# Patient Record
Sex: MALE | Race: WHITE | NOT HISPANIC OR LATINO | Employment: OTHER | URBAN - METROPOLITAN AREA
[De-identification: names, ages, dates, MRNs, and addresses within clinical notes are randomized per-mention and may not be internally consistent; named-entity substitution may affect disease eponyms.]

---

## 2018-04-04 ENCOUNTER — APPOINTMENT (OUTPATIENT)
Dept: GENERAL RADIOLOGY | Facility: HOSPITAL | Age: 63
End: 2018-04-04

## 2018-04-04 ENCOUNTER — HOSPITAL ENCOUNTER (EMERGENCY)
Facility: HOSPITAL | Age: 63
Discharge: HOME | End: 2018-04-04
Attending: EMERGENCY MEDICINE | Admitting: EMERGENCY MEDICINE

## 2018-04-04 VITALS
BODY MASS INDEX: 30.46 KG/M2 | HEART RATE: 58 BPM | TEMPERATURE: 97.5 F | OXYGEN SATURATION: 95 % | RESPIRATION RATE: 20 BRPM | HEIGHT: 75 IN | DIASTOLIC BLOOD PRESSURE: 78 MMHG | WEIGHT: 245 LBS | SYSTOLIC BLOOD PRESSURE: 132 MMHG

## 2018-04-04 DIAGNOSIS — R06.02 SHORTNESS OF BREATH: ICD-10-CM

## 2018-04-04 DIAGNOSIS — R07.9 EXERTIONAL CHEST PAIN: Primary | ICD-10-CM

## 2018-04-04 LAB
ALBUMIN SERPL-MCNC: 4.2 G/DL (ref 3.2–4.8)
ALBUMIN/GLOB SERPL: 1.6 G/DL (ref 1.5–2.5)
ALP SERPL-CCNC: 94 U/L (ref 25–100)
ALT SERPL W P-5'-P-CCNC: 33 U/L (ref 7–40)
ANION GAP SERPL CALCULATED.3IONS-SCNC: 4 MMOL/L (ref 3–11)
AST SERPL-CCNC: 37 U/L (ref 0–33)
BASOPHILS # BLD AUTO: 0.02 10*3/MM3 (ref 0–0.2)
BASOPHILS NFR BLD AUTO: 0.2 % (ref 0–1)
BILIRUB SERPL-MCNC: 0.6 MG/DL (ref 0.3–1.2)
BNP SERPL-MCNC: 52 PG/ML (ref 0–100)
BUN BLD-MCNC: 25 MG/DL (ref 9–23)
BUN/CREAT SERPL: 19.2 (ref 7–25)
CALCIUM SPEC-SCNC: 9 MG/DL (ref 8.7–10.4)
CHLORIDE SERPL-SCNC: 102 MMOL/L (ref 99–109)
CO2 SERPL-SCNC: 29 MMOL/L (ref 20–31)
CREAT BLD-MCNC: 1.3 MG/DL (ref 0.6–1.3)
DEPRECATED RDW RBC AUTO: 41.9 FL (ref 37–54)
EOSINOPHIL # BLD AUTO: 0.22 10*3/MM3 (ref 0–0.3)
EOSINOPHIL NFR BLD AUTO: 2.2 % (ref 0–3)
ERYTHROCYTE [DISTWIDTH] IN BLOOD BY AUTOMATED COUNT: 12.8 % (ref 11.3–14.5)
GFR SERPL CREATININE-BSD FRML MDRD: 55 ML/MIN/1.73
GLOBULIN UR ELPH-MCNC: 2.6 GM/DL
GLUCOSE BLD-MCNC: 157 MG/DL (ref 70–100)
HCT VFR BLD AUTO: 45.2 % (ref 38.9–50.9)
HGB BLD-MCNC: 15.8 G/DL (ref 13.1–17.5)
HOLD SPECIMEN: NORMAL
HOLD SPECIMEN: NORMAL
IMM GRANULOCYTES # BLD: 0.05 10*3/MM3 (ref 0–0.03)
IMM GRANULOCYTES NFR BLD: 0.5 % (ref 0–0.6)
LIPASE SERPL-CCNC: 38 U/L (ref 6–51)
LYMPHOCYTES # BLD AUTO: 2.08 10*3/MM3 (ref 0.6–4.8)
LYMPHOCYTES NFR BLD AUTO: 20.5 % (ref 24–44)
MCH RBC QN AUTO: 31.8 PG (ref 27–31)
MCHC RBC AUTO-ENTMCNC: 35 G/DL (ref 32–36)
MCV RBC AUTO: 90.9 FL (ref 80–99)
MONOCYTES # BLD AUTO: 0.77 10*3/MM3 (ref 0–1)
MONOCYTES NFR BLD AUTO: 7.6 % (ref 0–12)
NEUTROPHILS # BLD AUTO: 7.01 10*3/MM3 (ref 1.5–8.3)
NEUTROPHILS NFR BLD AUTO: 69 % (ref 41–71)
PLATELET # BLD AUTO: 189 10*3/MM3 (ref 150–450)
PMV BLD AUTO: 10.8 FL (ref 6–12)
POTASSIUM BLD-SCNC: 5.5 MMOL/L (ref 3.5–5.5)
PROT SERPL-MCNC: 6.8 G/DL (ref 5.7–8.2)
RBC # BLD AUTO: 4.97 10*6/MM3 (ref 4.2–5.76)
SODIUM BLD-SCNC: 135 MMOL/L (ref 132–146)
TROPONIN I SERPL-MCNC: <0.006 NG/ML
TROPONIN I SERPL-MCNC: <0.006 NG/ML
WBC NRBC COR # BLD: 10.15 10*3/MM3 (ref 3.5–10.8)
WHOLE BLOOD HOLD SPECIMEN: NORMAL
WHOLE BLOOD HOLD SPECIMEN: NORMAL

## 2018-04-04 PROCEDURE — 85025 COMPLETE CBC W/AUTO DIFF WBC: CPT

## 2018-04-04 PROCEDURE — 93005 ELECTROCARDIOGRAM TRACING: CPT

## 2018-04-04 PROCEDURE — 71045 X-RAY EXAM CHEST 1 VIEW: CPT

## 2018-04-04 PROCEDURE — 84484 ASSAY OF TROPONIN QUANT: CPT | Performed by: EMERGENCY MEDICINE

## 2018-04-04 PROCEDURE — 80053 COMPREHEN METABOLIC PANEL: CPT | Performed by: EMERGENCY MEDICINE

## 2018-04-04 PROCEDURE — 99284 EMERGENCY DEPT VISIT MOD MDM: CPT

## 2018-04-04 PROCEDURE — 83690 ASSAY OF LIPASE: CPT | Performed by: EMERGENCY MEDICINE

## 2018-04-04 PROCEDURE — 83880 ASSAY OF NATRIURETIC PEPTIDE: CPT | Performed by: EMERGENCY MEDICINE

## 2018-04-04 PROCEDURE — 93005 ELECTROCARDIOGRAM TRACING: CPT | Performed by: EMERGENCY MEDICINE

## 2018-04-04 RX ORDER — ASPIRIN 81 MG/1
324 TABLET, CHEWABLE ORAL ONCE
Status: DISCONTINUED | OUTPATIENT
Start: 2018-04-04 | End: 2018-04-04 | Stop reason: HOSPADM

## 2018-04-04 RX ORDER — SODIUM CHLORIDE 0.9 % (FLUSH) 0.9 %
10 SYRINGE (ML) INJECTION AS NEEDED
Status: DISCONTINUED | OUTPATIENT
Start: 2018-04-04 | End: 2018-04-04 | Stop reason: HOSPADM

## 2018-04-04 RX ORDER — BENAZEPRIL HYDROCHLORIDE 5 MG/1
10 TABLET, FILM COATED ORAL NIGHTLY
COMMUNITY
End: 2020-10-26 | Stop reason: SINTOL

## 2018-04-04 RX ORDER — ATORVASTATIN CALCIUM 80 MG/1
40 TABLET, FILM COATED ORAL DAILY
COMMUNITY

## 2018-04-04 RX ORDER — ASPIRIN 81 MG/1
81 TABLET ORAL DAILY
COMMUNITY

## 2018-04-04 RX ORDER — SERTRALINE HYDROCHLORIDE 100 MG/1
200 TABLET, FILM COATED ORAL DAILY
COMMUNITY

## 2018-04-04 RX ORDER — RANITIDINE 150 MG/1
150 TABLET ORAL 2 TIMES DAILY
Status: ON HOLD | COMMUNITY
End: 2020-06-29

## 2018-04-04 NOTE — ED PROVIDER NOTES
Subjective   Ms. Milind Pack is a 63 y.o. male who presents to the ED with c/o SoA. He notes of a couple months of gradually worsening SoA, fatigue, and chest pain. Today while performing yard work, he had a particularly bad episode, and could not do anything for the rest of the day. He denies any N/V, diaphoresis, radiating pain, or any other acute sx at this time. Family history is significant for cardiovascular disease.         History provided by:  Patient  Shortness of Breath   Severity:  Moderate  Onset quality:  Gradual  Duration: Couple months.  Timing:  Constant  Progression:  Worsening  Chronicity:  New  Context: activity    Relieved by:  None tried  Worsened by:  Exertion  Ineffective treatments:  None tried  Associated symptoms: chest pain    Associated symptoms: no abdominal pain, no cough, no diaphoresis, no fever, no sore throat and no vomiting        Review of Systems   Constitutional: Positive for fatigue. Negative for chills, diaphoresis and fever.   HENT: Negative for congestion, rhinorrhea and sore throat.    Respiratory: Positive for shortness of breath. Negative for cough.    Cardiovascular: Positive for chest pain.   Gastrointestinal: Negative for abdominal pain, blood in stool, diarrhea, nausea and vomiting.   Genitourinary: Negative for difficulty urinating, dysuria and hematuria.   Neurological: Positive for weakness (Generalized).   All other systems reviewed and are negative.      Past Medical History:   Diagnosis Date   • Diabetes mellitus    • Hypertension        Allergies   Allergen Reactions   • Lisinopril Dizziness       Past Surgical History:   Procedure Laterality Date   • AORTIC VALVE SURGERY     • CATARACT EXTRACTION         History reviewed. No pertinent family history.    Social History     Social History   • Marital status:      Social History Main Topics   • Smoking status: Never Smoker   • Alcohol use No   • Drug use: No     Other Topics Concern   • Not on file          Objective   Physical Exam   Constitutional: He is oriented to person, place, and time. He appears well-developed and well-nourished. No distress.   HENT:   Head: Normocephalic and atraumatic.   Nose: Nose normal.   Eyes: Conjunctivae are normal. No scleral icterus.   Neck: Normal range of motion. Neck supple.   Cardiovascular: Normal rate, regular rhythm, normal heart sounds and intact distal pulses.    No murmur heard.  Pulmonary/Chest: Effort normal and breath sounds normal. No respiratory distress.   Musculoskeletal: Normal range of motion.   Neurological: He is alert and oriented to person, place, and time.   Skin: Skin is warm and dry. He is not diaphoretic.   Psychiatric: He has a normal mood and affect. His behavior is normal.   Nursing note and vitals reviewed.      Procedures         ED Course  ED Course   EKG SR/SB.  CXR negative.  Chest pain free on rechecks.  Workup including two cardiac sets is negative for acute serious pathology.  I discussed findings and concerns with patient in detail.  We discussed in particular that while acute MI has been excluded today, it is not possible to exclude underlying CAD based on ED workup alone.  We discussed the importance of immediate-term followup for provocative testing such as stress testing as well as methods of arranging that.  We discussed the importance of office followup subsequent to testing to discuss results and any further testing or treatment that may be indicated.  Patient is advised to return to the ED for any concerning symptoms, especially prior to the completion of further outpatient testing.                    MDM  Number of Diagnoses or Management Options  Exertional chest pain:   Shortness of breath:      Amount and/or Complexity of Data Reviewed  Clinical lab tests: ordered and reviewed  Tests in the radiology section of CPT®: ordered and reviewed  Independent visualization of images, tracings, or specimens: yes        Final diagnoses:    Exertional chest pain   Shortness of breath       Documentation assistance provided by crystal LEONARD.  Information recorded by the scribtadeo was done at my direction and has been verified and validated by me.     Gian Leonard  04/04/18 8929       Tu Chairez MD  04/04/18 1947

## 2018-04-06 ENCOUNTER — TELEPHONE (OUTPATIENT)
Dept: CARDIOLOGY | Facility: HOSPITAL | Age: 63
End: 2018-04-06

## 2018-04-06 ENCOUNTER — HOSPITAL ENCOUNTER (OUTPATIENT)
Dept: CARDIOLOGY | Facility: HOSPITAL | Age: 63
Discharge: HOME | End: 2018-04-06

## 2018-04-06 ENCOUNTER — OFFICE VISIT (OUTPATIENT)
Dept: CARDIOLOGY | Facility: HOSPITAL | Age: 63
End: 2018-04-06

## 2018-04-06 VITALS
DIASTOLIC BLOOD PRESSURE: 73 MMHG | HEART RATE: 61 BPM | RESPIRATION RATE: 16 BRPM | BODY MASS INDEX: 29.88 KG/M2 | OXYGEN SATURATION: 97 % | SYSTOLIC BLOOD PRESSURE: 119 MMHG | HEIGHT: 75 IN | WEIGHT: 240.3 LBS | TEMPERATURE: 97.5 F

## 2018-04-06 VITALS
HEIGHT: 75 IN | HEART RATE: 55 BPM | BODY MASS INDEX: 29.84 KG/M2 | WEIGHT: 240 LBS | DIASTOLIC BLOOD PRESSURE: 92 MMHG | SYSTOLIC BLOOD PRESSURE: 146 MMHG

## 2018-04-06 DIAGNOSIS — I10 ESSENTIAL HYPERTENSION: ICD-10-CM

## 2018-04-06 DIAGNOSIS — R07.2 PRECORDIAL PAIN: ICD-10-CM

## 2018-04-06 DIAGNOSIS — E11.9 TYPE 2 DIABETES MELLITUS WITHOUT COMPLICATION, WITH LONG-TERM CURRENT USE OF INSULIN (HCC): ICD-10-CM

## 2018-04-06 DIAGNOSIS — Z79.4 TYPE 2 DIABETES MELLITUS WITHOUT COMPLICATION, WITH LONG-TERM CURRENT USE OF INSULIN (HCC): ICD-10-CM

## 2018-04-06 DIAGNOSIS — R07.2 PRECORDIAL PAIN: Primary | ICD-10-CM

## 2018-04-06 DIAGNOSIS — E78.2 MIXED HYPERLIPIDEMIA: ICD-10-CM

## 2018-04-06 DIAGNOSIS — R06.02 SHORTNESS OF BREATH: ICD-10-CM

## 2018-04-06 LAB
BH CV STRESS BP STAGE 1: NORMAL
BH CV STRESS BP STAGE 2: NORMAL
BH CV STRESS DURATION MIN STAGE 1: 3
BH CV STRESS DURATION MIN STAGE 2: 3
BH CV STRESS DURATION MIN STAGE 3: 2
BH CV STRESS DURATION SEC STAGE 1: 0
BH CV STRESS DURATION SEC STAGE 2: 0
BH CV STRESS DURATION SEC STAGE 3: 16
BH CV STRESS GRADE STAGE 1: 10
BH CV STRESS GRADE STAGE 2: 12
BH CV STRESS GRADE STAGE 3: 14
BH CV STRESS HR STAGE 1: 88
BH CV STRESS HR STAGE 2: 106
BH CV STRESS HR STAGE 3: 116
BH CV STRESS METS STAGE 1: 5
BH CV STRESS METS STAGE 2: 7.5
BH CV STRESS METS STAGE 3: 10
BH CV STRESS O2 STAGE 1: 97
BH CV STRESS O2 STAGE 2: 93
BH CV STRESS O2 STAGE 3: 85
BH CV STRESS PROTOCOL 1: NORMAL
BH CV STRESS PROTOCOL 2 BP STAGE 2: NORMAL
BH CV STRESS PROTOCOL 2 BP STAGE 3: 77
BH CV STRESS PROTOCOL 2 BP STAGE 4: 78
BH CV STRESS PROTOCOL 2 COMMENTS STAGE 1: NORMAL
BH CV STRESS PROTOCOL 2 DOSE REGADENOSON STAGE 1: 0.4
BH CV STRESS PROTOCOL 2 DURATION MIN STAGE 1: 1
BH CV STRESS PROTOCOL 2 DURATION MIN STAGE 2: 1
BH CV STRESS PROTOCOL 2 DURATION MIN STAGE 3: 1
BH CV STRESS PROTOCOL 2 DURATION MIN STAGE 4: 1
BH CV STRESS PROTOCOL 2 DURATION SEC STAGE 2: 0
BH CV STRESS PROTOCOL 2 HR STAGE 1: 87
BH CV STRESS PROTOCOL 2 HR STAGE 2: 82
BH CV STRESS PROTOCOL 2 HR STAGE 4: NORMAL
BH CV STRESS PROTOCOL 2 STAGE 1: 1
BH CV STRESS PROTOCOL 2 STAGE 2: 2
BH CV STRESS PROTOCOL 2 STAGE 3: 3
BH CV STRESS PROTOCOL 2 STAGE 4: 4
BH CV STRESS PROTOCOL 2: NORMAL
BH CV STRESS RECOVERY BP: NORMAL MMHG
BH CV STRESS RECOVERY HR: 76 BPM
BH CV STRESS SPEED STAGE 1: 1.7
BH CV STRESS SPEED STAGE 2: 2.5
BH CV STRESS SPEED STAGE 3: 3.4
BH CV STRESS STAGE 1: 1
BH CV STRESS STAGE 2: 2
BH CV STRESS STAGE 3: 3
LV EF NUC BP: 78 %
MAXIMAL PREDICTED HEART RATE: 155 BPM
PERCENT MAX PREDICTED HR: 75.48 %
STRESS BASELINE BP: NORMAL MMHG
STRESS BASELINE HR: 56 BPM
STRESS PERCENT HR: 89 %
STRESS POST EXERCISE DUR MIN: 8 MIN
STRESS POST EXERCISE DUR SEC: 16 SEC
STRESS POST PEAK BP: NORMAL MMHG
STRESS POST PEAK HR: 117 BPM
STRESS TARGET HR: 132 BPM

## 2018-04-06 PROCEDURE — 93018 CV STRESS TEST I&R ONLY: CPT | Performed by: INTERNAL MEDICINE

## 2018-04-06 PROCEDURE — 0 TECHNETIUM SESTAMIBI: Performed by: NURSE PRACTITIONER

## 2018-04-06 PROCEDURE — 78452 HT MUSCLE IMAGE SPECT MULT: CPT

## 2018-04-06 PROCEDURE — 25010000002 REGADENOSON 0.4 MG/5ML SOLUTION: Performed by: NURSE PRACTITIONER

## 2018-04-06 PROCEDURE — 78452 HT MUSCLE IMAGE SPECT MULT: CPT | Performed by: INTERNAL MEDICINE

## 2018-04-06 PROCEDURE — 99204 OFFICE O/P NEW MOD 45 MIN: CPT | Performed by: NURSE PRACTITIONER

## 2018-04-06 PROCEDURE — 93017 CV STRESS TEST TRACING ONLY: CPT

## 2018-04-06 PROCEDURE — A9500 TC99M SESTAMIBI: HCPCS | Performed by: NURSE PRACTITIONER

## 2018-04-06 RX ORDER — NAPROXEN SODIUM 220 MG
1-2 TABLET ORAL SEE ADMIN INSTRUCTIONS
COMMUNITY
End: 2020-10-26

## 2018-04-06 RX ORDER — CHLORAL HYDRATE 500 MG
1 CAPSULE ORAL DAILY
COMMUNITY

## 2018-04-06 RX ORDER — NAPROXEN SODIUM 220 MG
1-2 TABLET ORAL SEE ADMIN INSTRUCTIONS
COMMUNITY
End: 2018-04-06

## 2018-04-06 RX ADMIN — REGADENOSON 0.4 MG: 0.08 INJECTION, SOLUTION INTRAVENOUS at 14:03

## 2018-04-06 RX ADMIN — TECHNETIUM TC 99M SESTAMIBI 1 DOSE: 1 INJECTION INTRAVENOUS at 14:05

## 2018-04-06 RX ADMIN — TECHNETIUM TC 99M SESTAMIBI 1 DOSE: 1 INJECTION INTRAVENOUS at 11:55

## 2020-06-28 ENCOUNTER — APPOINTMENT (OUTPATIENT)
Dept: GENERAL RADIOLOGY | Facility: HOSPITAL | Age: 65
End: 2020-06-28

## 2020-06-28 ENCOUNTER — HOSPITAL ENCOUNTER (OUTPATIENT)
Facility: HOSPITAL | Age: 65
Discharge: HOME | End: 2020-06-29
Attending: EMERGENCY MEDICINE | Admitting: INTERNAL MEDICINE

## 2020-06-28 DIAGNOSIS — R07.9 CHEST PAIN, UNSPECIFIED TYPE: Primary | ICD-10-CM

## 2020-06-28 LAB
ALBUMIN SERPL-MCNC: 4.1 G/DL (ref 3.5–5.2)
ALBUMIN/GLOB SERPL: 1.9 G/DL
ALP SERPL-CCNC: 77 U/L (ref 39–117)
ALT SERPL W P-5'-P-CCNC: 22 U/L (ref 1–41)
ANION GAP SERPL CALCULATED.3IONS-SCNC: 9 MMOL/L (ref 5–15)
AST SERPL-CCNC: 24 U/L (ref 1–40)
BASOPHILS # BLD AUTO: 0.04 10*3/MM3 (ref 0–0.2)
BASOPHILS NFR BLD AUTO: 0.6 % (ref 0–1.5)
BILIRUB SERPL-MCNC: 0.5 MG/DL (ref 0.2–1.2)
BUN BLD-MCNC: 32 MG/DL (ref 8–23)
BUN/CREAT SERPL: 25.4 (ref 7–25)
CALCIUM SPEC-SCNC: 8.9 MG/DL (ref 8.6–10.5)
CHLORIDE SERPL-SCNC: 104 MMOL/L (ref 98–107)
CO2 SERPL-SCNC: 26 MMOL/L (ref 22–29)
CREAT BLD-MCNC: 1.26 MG/DL (ref 0.76–1.27)
D DIMER PPP FEU-MCNC: 1.45 MCGFEU/ML (ref 0–0.56)
D-LACTATE SERPL-SCNC: 0.9 MMOL/L (ref 0.5–2)
DEPRECATED RDW RBC AUTO: 40.7 FL (ref 37–54)
EOSINOPHIL # BLD AUTO: 0.24 10*3/MM3 (ref 0–0.4)
EOSINOPHIL NFR BLD AUTO: 3.6 % (ref 0.3–6.2)
ERYTHROCYTE [DISTWIDTH] IN BLOOD BY AUTOMATED COUNT: 11.7 % (ref 12.3–15.4)
GFR SERPL CREATININE-BSD FRML MDRD: 57 ML/MIN/1.73
GLOBULIN UR ELPH-MCNC: 2.2 GM/DL
GLUCOSE BLD-MCNC: 138 MG/DL (ref 65–99)
HCT VFR BLD AUTO: 42.5 % (ref 37.5–51)
HGB BLD-MCNC: 14.5 G/DL (ref 13–17.7)
IMM GRANULOCYTES # BLD AUTO: 0.04 10*3/MM3 (ref 0–0.05)
IMM GRANULOCYTES NFR BLD AUTO: 0.6 % (ref 0–0.5)
LYMPHOCYTES # BLD AUTO: 2.07 10*3/MM3 (ref 0.7–3.1)
LYMPHOCYTES NFR BLD AUTO: 30.9 % (ref 19.6–45.3)
MCH RBC QN AUTO: 32.3 PG (ref 26.6–33)
MCHC RBC AUTO-ENTMCNC: 34.1 G/DL (ref 31.5–35.7)
MCV RBC AUTO: 94.7 FL (ref 79–97)
MONOCYTES # BLD AUTO: 0.84 10*3/MM3 (ref 0.1–0.9)
MONOCYTES NFR BLD AUTO: 12.5 % (ref 5–12)
NEUTROPHILS # BLD AUTO: 3.47 10*3/MM3 (ref 1.7–7)
NEUTROPHILS NFR BLD AUTO: 51.8 % (ref 42.7–76)
NRBC BLD AUTO-RTO: 0 /100 WBC (ref 0–0.2)
PLATELET # BLD AUTO: 157 10*3/MM3 (ref 140–450)
PMV BLD AUTO: 10.6 FL (ref 6–12)
POTASSIUM BLD-SCNC: 4.5 MMOL/L (ref 3.5–5.2)
PROT SERPL-MCNC: 6.3 G/DL (ref 6–8.5)
RBC # BLD AUTO: 4.49 10*6/MM3 (ref 4.14–5.8)
SODIUM BLD-SCNC: 139 MMOL/L (ref 136–145)
TROPONIN T SERPL-MCNC: <0.01 NG/ML (ref 0–0.03)
WBC NRBC COR # BLD: 6.7 10*3/MM3 (ref 3.4–10.8)

## 2020-06-28 PROCEDURE — 71045 X-RAY EXAM CHEST 1 VIEW: CPT

## 2020-06-28 PROCEDURE — 85025 COMPLETE CBC W/AUTO DIFF WBC: CPT | Performed by: EMERGENCY MEDICINE

## 2020-06-28 PROCEDURE — 99284 EMERGENCY DEPT VISIT MOD MDM: CPT

## 2020-06-28 PROCEDURE — 85379 FIBRIN DEGRADATION QUANT: CPT | Performed by: EMERGENCY MEDICINE

## 2020-06-28 PROCEDURE — 83605 ASSAY OF LACTIC ACID: CPT | Performed by: EMERGENCY MEDICINE

## 2020-06-28 PROCEDURE — 80053 COMPREHEN METABOLIC PANEL: CPT | Performed by: EMERGENCY MEDICINE

## 2020-06-28 PROCEDURE — 84484 ASSAY OF TROPONIN QUANT: CPT | Performed by: EMERGENCY MEDICINE

## 2020-06-28 PROCEDURE — 93005 ELECTROCARDIOGRAM TRACING: CPT | Performed by: EMERGENCY MEDICINE

## 2020-06-28 RX ORDER — ASPIRIN 81 MG/1
324 TABLET, CHEWABLE ORAL ONCE
Status: COMPLETED | OUTPATIENT
Start: 2020-06-28 | End: 2020-06-28

## 2020-06-28 RX ORDER — SODIUM CHLORIDE 0.9 % (FLUSH) 0.9 %
10 SYRINGE (ML) INJECTION AS NEEDED
Status: DISCONTINUED | OUTPATIENT
Start: 2020-06-28 | End: 2020-06-29 | Stop reason: HOSPADM

## 2020-06-28 RX ADMIN — ASPIRIN 81 MG CHEWABLE TABLET 324 MG: 81 TABLET CHEWABLE at 23:17

## 2020-06-29 ENCOUNTER — APPOINTMENT (OUTPATIENT)
Dept: CT IMAGING | Facility: HOSPITAL | Age: 65
End: 2020-06-29

## 2020-06-29 ENCOUNTER — APPOINTMENT (OUTPATIENT)
Dept: CARDIOLOGY | Facility: HOSPITAL | Age: 65
End: 2020-06-29

## 2020-06-29 VITALS
BODY MASS INDEX: 29.09 KG/M2 | RESPIRATION RATE: 18 BRPM | HEIGHT: 75 IN | SYSTOLIC BLOOD PRESSURE: 112 MMHG | OXYGEN SATURATION: 94 % | HEART RATE: 58 BPM | WEIGHT: 234 LBS | DIASTOLIC BLOOD PRESSURE: 63 MMHG | TEMPERATURE: 97.9 F

## 2020-06-29 PROBLEM — F39 MOOD DISORDER (HCC): Status: ACTIVE | Noted: 2020-06-29

## 2020-06-29 PROBLEM — E11.9 T2DM (TYPE 2 DIABETES MELLITUS): Status: ACTIVE | Noted: 2020-06-29

## 2020-06-29 PROBLEM — I10 HTN (HYPERTENSION): Status: ACTIVE | Noted: 2020-06-29

## 2020-06-29 PROBLEM — R07.9 CHEST PAIN: Status: ACTIVE | Noted: 2020-06-29

## 2020-06-29 PROBLEM — Z95.2 S/P AVR: Status: ACTIVE | Noted: 2020-06-29

## 2020-06-29 LAB
ANION GAP SERPL CALCULATED.3IONS-SCNC: 9 MMOL/L (ref 5–15)
APTT PPP: 30.6 SECONDS (ref 50–95)
ASCENDING AORTA: 4.2 CM
BASOPHILS # BLD AUTO: 0.04 10*3/MM3 (ref 0–0.2)
BASOPHILS NFR BLD AUTO: 0.6 % (ref 0–1.5)
BH CV ECHO MEAS - AO MAX PG (FULL): 20.5 MMHG
BH CV ECHO MEAS - AO MAX PG: 23 MMHG
BH CV ECHO MEAS - AO MEAN PG (FULL): 11.3 MMHG
BH CV ECHO MEAS - AO MEAN PG: 12.7 MMHG
BH CV ECHO MEAS - AO ROOT AREA (BSA CORRECTED): 1.2
BH CV ECHO MEAS - AO ROOT AREA: 5.8 CM^2
BH CV ECHO MEAS - AO ROOT DIAM: 2.7 CM
BH CV ECHO MEAS - AO V2 MAX: 239.7 CM/SEC
BH CV ECHO MEAS - AO V2 MEAN: 168.3 CM/SEC
BH CV ECHO MEAS - AO V2 VTI: 58.1 CM
BH CV ECHO MEAS - ASC AORTA: 4.2 CM
BH CV ECHO MEAS - AVA(I,A): 1.2 CM^2
BH CV ECHO MEAS - AVA(I,D): 1.2 CM^2
BH CV ECHO MEAS - AVA(V,A): 1.1 CM^2
BH CV ECHO MEAS - AVA(V,D): 1.1 CM^2
BH CV ECHO MEAS - BSA(HAYCOCK): 2.4 M^2
BH CV ECHO MEAS - BSA: 2.3 M^2
BH CV ECHO MEAS - BZI_BMI: 29.2 KILOGRAMS/M^2
BH CV ECHO MEAS - BZI_METRIC_HEIGHT: 190.5 CM
BH CV ECHO MEAS - BZI_METRIC_WEIGHT: 106.1 KG
BH CV ECHO MEAS - EDV(CUBED): 138.1 ML
BH CV ECHO MEAS - EDV(MOD-SP2): 146 ML
BH CV ECHO MEAS - EDV(MOD-SP4): 126 ML
BH CV ECHO MEAS - EDV(TEICH): 127.8 ML
BH CV ECHO MEAS - EF(CUBED): 78.7 %
BH CV ECHO MEAS - EF(MOD-SP2): 65.1 %
BH CV ECHO MEAS - EF(MOD-SP4): 58.7 %
BH CV ECHO MEAS - EF(TEICH): 70.7 %
BH CV ECHO MEAS - EF{MOD-BP}: 61 %
BH CV ECHO MEAS - ESV(CUBED): 29.4 ML
BH CV ECHO MEAS - ESV(MOD-SP2): 51 ML
BH CV ECHO MEAS - ESV(MOD-SP4): 52 ML
BH CV ECHO MEAS - ESV(TEICH): 37.5 ML
BH CV ECHO MEAS - FS: 40.3 %
BH CV ECHO MEAS - IVS/LVPW: 0.99
BH CV ECHO MEAS - IVSD: 1.2 CM
BH CV ECHO MEAS - LA DIMENSION: 3.9 CM
BH CV ECHO MEAS - LA/AO: 1.5
BH CV ECHO MEAS - LAD MAJOR: 6 CM
BH CV ECHO MEAS - LAT PEAK E' VEL: 9.5 CM/SEC
BH CV ECHO MEAS - LATERAL E/E' RATIO: 6.5
BH CV ECHO MEAS - LV DIASTOLIC VOL/BSA (35-75): 53.7 ML/M^2
BH CV ECHO MEAS - LV MASS(C)D: 239.3 GRAMS
BH CV ECHO MEAS - LV MASS(C)DI: 102 GRAMS/M^2
BH CV ECHO MEAS - LV MAX PG: 2.5 MMHG
BH CV ECHO MEAS - LV MEAN PG: 1.5 MMHG
BH CV ECHO MEAS - LV SYSTOLIC VOL/BSA (12-30): 22.2 ML/M^2
BH CV ECHO MEAS - LV V1 MAX: 78.5 CM/SEC
BH CV ECHO MEAS - LV V1 MEAN: 56 CM/SEC
BH CV ECHO MEAS - LV V1 VTI: 21.1 CM
BH CV ECHO MEAS - LVIDD: 5.2 CM
BH CV ECHO MEAS - LVIDS: 3.1 CM
BH CV ECHO MEAS - LVLD AP2: 8.9 CM
BH CV ECHO MEAS - LVLD AP4: 8.7 CM
BH CV ECHO MEAS - LVLS AP2: 7 CM
BH CV ECHO MEAS - LVLS AP4: 7.5 CM
BH CV ECHO MEAS - LVOT AREA (M): 3.5 CM^2
BH CV ECHO MEAS - LVOT AREA: 3.4 CM^2
BH CV ECHO MEAS - LVOT DIAM: 2.1 CM
BH CV ECHO MEAS - LVPWD: 1.2 CM
BH CV ECHO MEAS - MED PEAK E' VEL: 7.7 CM/SEC
BH CV ECHO MEAS - MEDIAL E/E' RATIO: 8
BH CV ECHO MEAS - MV A MAX VEL: 64.4 CM/SEC
BH CV ECHO MEAS - MV DEC SLOPE: 338.1 CM/SEC^2
BH CV ECHO MEAS - MV E MAX VEL: 62.7 CM/SEC
BH CV ECHO MEAS - MV E/A: 0.97
BH CV ECHO MEAS - MV MAX PG: 2 MMHG
BH CV ECHO MEAS - MV MEAN PG: 0.67 MMHG
BH CV ECHO MEAS - MV P1/2T MAX VEL: 68.2 CM/SEC
BH CV ECHO MEAS - MV P1/2T: 59.1 MSEC
BH CV ECHO MEAS - MV V2 MAX: 71.2 CM/SEC
BH CV ECHO MEAS - MV V2 MEAN: 35.9 CM/SEC
BH CV ECHO MEAS - MV V2 VTI: 22.5 CM
BH CV ECHO MEAS - MVA P1/2T LCG: 3.2 CM^2
BH CV ECHO MEAS - MVA(P1/2T): 3.7 CM^2
BH CV ECHO MEAS - MVA(VTI): 3.2 CM^2
BH CV ECHO MEAS - PA ACC SLOPE: 764.9 CM/SEC^2
BH CV ECHO MEAS - PA ACC TIME: 0.1 SEC
BH CV ECHO MEAS - PA MAX PG: 3.8 MMHG
BH CV ECHO MEAS - PA PR(ACCEL): 34.6 MMHG
BH CV ECHO MEAS - PA V2 MAX: 97.7 CM/SEC
BH CV ECHO MEAS - PI END-D VEL: 70.6 CM/SEC
BH CV ECHO MEAS - RAP SYSTOLE: 8 MMHG
BH CV ECHO MEAS - RVSP: 39 MMHG
BH CV ECHO MEAS - SI(AO): 142.8 ML/M^2
BH CV ECHO MEAS - SI(CUBED): 46.4 ML/M^2
BH CV ECHO MEAS - SI(LVOT): 30.5 ML/M^2
BH CV ECHO MEAS - SI(MOD-SP2): 40.5 ML/M^2
BH CV ECHO MEAS - SI(MOD-SP4): 31.5 ML/M^2
BH CV ECHO MEAS - SI(TEICH): 38.5 ML/M^2
BH CV ECHO MEAS - SV(AO): 335 ML
BH CV ECHO MEAS - SV(CUBED): 108.8 ML
BH CV ECHO MEAS - SV(LVOT): 71.4 ML
BH CV ECHO MEAS - SV(MOD-SP2): 95 ML
BH CV ECHO MEAS - SV(MOD-SP4): 74 ML
BH CV ECHO MEAS - SV(TEICH): 90.3 ML
BH CV ECHO MEAS - TAPSE (>1.6): 2 CM2
BH CV ECHO MEAS - TR MAX PG: 31 MMHG
BH CV ECHO MEAS - TR MAX VEL: 278 CM/SEC
BH CV ECHO MEASUREMENTS AVERAGE E/E' RATIO: 7.29
BH CV VAS BP RIGHT ARM: NORMAL MMHG
BH CV XLRA - RV BASE: 3.8 CM
BH CV XLRA - RV LENGTH: 7.6 CM
BH CV XLRA - RV MID: 2.8 CM
BH CV XLRA - TDI S': 8 CM/SEC
BUN BLD-MCNC: 32 MG/DL (ref 8–23)
BUN/CREAT SERPL: 28.8 (ref 7–25)
CALCIUM SPEC-SCNC: 9 MG/DL (ref 8.6–10.5)
CHLORIDE SERPL-SCNC: 106 MMOL/L (ref 98–107)
CHOLEST SERPL-MCNC: 95 MG/DL (ref 0–200)
CO2 SERPL-SCNC: 24 MMOL/L (ref 22–29)
CREAT BLD-MCNC: 1.11 MG/DL (ref 0.76–1.27)
DEPRECATED RDW RBC AUTO: 41 FL (ref 37–54)
EOSINOPHIL # BLD AUTO: 0.25 10*3/MM3 (ref 0–0.4)
EOSINOPHIL NFR BLD AUTO: 4 % (ref 0.3–6.2)
ERYTHROCYTE [DISTWIDTH] IN BLOOD BY AUTOMATED COUNT: 11.9 % (ref 12.3–15.4)
GFR SERPL CREATININE-BSD FRML MDRD: 66 ML/MIN/1.73
GLUCOSE BLD-MCNC: 110 MG/DL (ref 65–99)
GLUCOSE BLDC GLUCOMTR-MCNC: 105 MG/DL (ref 70–130)
GLUCOSE BLDC GLUCOMTR-MCNC: 115 MG/DL (ref 70–130)
GLUCOSE BLDC GLUCOMTR-MCNC: 79 MG/DL (ref 70–130)
HBA1C MFR BLD: 7.7 % (ref 4.8–5.6)
HCT VFR BLD AUTO: 43.7 % (ref 37.5–51)
HDLC SERPL-MCNC: 34 MG/DL (ref 40–60)
HGB BLD-MCNC: 15.1 G/DL (ref 13–17.7)
HOLD SPECIMEN: NORMAL
HOLD SPECIMEN: NORMAL
IMM GRANULOCYTES # BLD AUTO: 0.03 10*3/MM3 (ref 0–0.05)
IMM GRANULOCYTES NFR BLD AUTO: 0.5 % (ref 0–0.5)
INR PPP: 1.12 (ref 0.85–1.16)
LDLC SERPL CALC-MCNC: 39 MG/DL (ref 0–100)
LDLC/HDLC SERPL: 1.14 {RATIO}
LEFT ATRIUM VOLUME INDEX: 28.1 ML/M^2
LEFT ATRIUM VOLUME: 66 ML
LYMPHOCYTES # BLD AUTO: 2.34 10*3/MM3 (ref 0.7–3.1)
LYMPHOCYTES NFR BLD AUTO: 37.3 % (ref 19.6–45.3)
MAXIMAL PREDICTED HEART RATE: 153 BPM
MCH RBC QN AUTO: 32.5 PG (ref 26.6–33)
MCHC RBC AUTO-ENTMCNC: 34.6 G/DL (ref 31.5–35.7)
MCV RBC AUTO: 94.2 FL (ref 79–97)
MONOCYTES # BLD AUTO: 0.69 10*3/MM3 (ref 0.1–0.9)
MONOCYTES NFR BLD AUTO: 11 % (ref 5–12)
NEUTROPHILS # BLD AUTO: 2.92 10*3/MM3 (ref 1.7–7)
NEUTROPHILS NFR BLD AUTO: 46.6 % (ref 42.7–76)
NRBC BLD AUTO-RTO: 0 /100 WBC (ref 0–0.2)
PLATELET # BLD AUTO: 144 10*3/MM3 (ref 140–450)
PMV BLD AUTO: 10.6 FL (ref 6–12)
POTASSIUM BLD-SCNC: 4.3 MMOL/L (ref 3.5–5.2)
PROTHROMBIN TIME: 14.1 SECONDS (ref 11.5–14)
RBC # BLD AUTO: 4.64 10*6/MM3 (ref 4.14–5.8)
SARS-COV-2 RNA RESP QL NAA+PROBE: NOT DETECTED
SODIUM BLD-SCNC: 139 MMOL/L (ref 136–145)
STRESS TARGET HR: 130 BPM
TRIGL SERPL-MCNC: 111 MG/DL (ref 0–150)
TROPONIN T SERPL-MCNC: <0.01 NG/ML (ref 0–0.03)
TSH SERPL DL<=0.05 MIU/L-ACNC: 0.91 UIU/ML (ref 0.27–4.2)
UFH PPP CHRO-ACNC: 0.1 IU/ML (ref 0.3–0.7)
UFH PPP CHRO-ACNC: 0.19 IU/ML (ref 0.3–0.7)
VLDLC SERPL-MCNC: 22.2 MG/DL
WBC NRBC COR # BLD: 6.27 10*3/MM3 (ref 3.4–10.8)
WHOLE BLOOD HOLD SPECIMEN: NORMAL
WHOLE BLOOD HOLD SPECIMEN: NORMAL

## 2020-06-29 PROCEDURE — 84443 ASSAY THYROID STIM HORMONE: CPT | Performed by: PHYSICIAN ASSISTANT

## 2020-06-29 PROCEDURE — 87635 SARS-COV-2 COVID-19 AMP PRB: CPT | Performed by: INTERNAL MEDICINE

## 2020-06-29 PROCEDURE — 0 IOPAMIDOL PER 1 ML: Performed by: EMERGENCY MEDICINE

## 2020-06-29 PROCEDURE — 85730 THROMBOPLASTIN TIME PARTIAL: CPT | Performed by: PHYSICIAN ASSISTANT

## 2020-06-29 PROCEDURE — 99203 OFFICE O/P NEW LOW 30 MIN: CPT | Performed by: INTERNAL MEDICINE

## 2020-06-29 PROCEDURE — A9270 NON-COVERED ITEM OR SERVICE: HCPCS | Performed by: INTERNAL MEDICINE

## 2020-06-29 PROCEDURE — 0 IOPAMIDOL PER 1 ML: Performed by: INTERNAL MEDICINE

## 2020-06-29 PROCEDURE — 93454 CORONARY ARTERY ANGIO S&I: CPT | Performed by: INTERNAL MEDICINE

## 2020-06-29 PROCEDURE — 25010000002 FENTANYL CITRATE (PF) 100 MCG/2ML SOLUTION: Performed by: INTERNAL MEDICINE

## 2020-06-29 PROCEDURE — G0378 HOSPITAL OBSERVATION PER HR: HCPCS

## 2020-06-29 PROCEDURE — 85520 HEPARIN ASSAY: CPT | Performed by: INTERNAL MEDICINE

## 2020-06-29 PROCEDURE — 93306 TTE W/DOPPLER COMPLETE: CPT | Performed by: INTERNAL MEDICINE

## 2020-06-29 PROCEDURE — 83036 HEMOGLOBIN GLYCOSYLATED A1C: CPT | Performed by: PHYSICIAN ASSISTANT

## 2020-06-29 PROCEDURE — G0108 DIAB MANAGE TRN  PER INDIV: HCPCS

## 2020-06-29 PROCEDURE — 82962 GLUCOSE BLOOD TEST: CPT

## 2020-06-29 PROCEDURE — 84484 ASSAY OF TROPONIN QUANT: CPT | Performed by: EMERGENCY MEDICINE

## 2020-06-29 PROCEDURE — 80061 LIPID PANEL: CPT | Performed by: PHYSICIAN ASSISTANT

## 2020-06-29 PROCEDURE — 25010000002 MIDAZOLAM PER 1 MG: Performed by: INTERNAL MEDICINE

## 2020-06-29 PROCEDURE — 96366 THER/PROPH/DIAG IV INF ADDON: CPT

## 2020-06-29 PROCEDURE — C1769 GUIDE WIRE: HCPCS | Performed by: INTERNAL MEDICINE

## 2020-06-29 PROCEDURE — 85520 HEPARIN ASSAY: CPT | Performed by: PHYSICIAN ASSISTANT

## 2020-06-29 PROCEDURE — 96365 THER/PROPH/DIAG IV INF INIT: CPT

## 2020-06-29 PROCEDURE — 63710000001 FAMOTIDINE 20 MG TABLET: Performed by: INTERNAL MEDICINE

## 2020-06-29 PROCEDURE — 93005 ELECTROCARDIOGRAM TRACING: CPT | Performed by: PHYSICIAN ASSISTANT

## 2020-06-29 PROCEDURE — C9803 HOPD COVID-19 SPEC COLLECT: HCPCS

## 2020-06-29 PROCEDURE — 99235 HOSP IP/OBS SAME DATE MOD 70: CPT | Performed by: INTERNAL MEDICINE

## 2020-06-29 PROCEDURE — 25010000002 HEPARIN (PORCINE) PER 1000 UNITS: Performed by: PHYSICIAN ASSISTANT

## 2020-06-29 PROCEDURE — 93306 TTE W/DOPPLER COMPLETE: CPT

## 2020-06-29 PROCEDURE — 63710000001 METOPROLOL TARTRATE 25 MG TABLET: Performed by: INTERNAL MEDICINE

## 2020-06-29 PROCEDURE — C9803 HOPD COVID-19 SPEC COLLECT: HCPCS | Performed by: INTERNAL MEDICINE

## 2020-06-29 PROCEDURE — 80048 BASIC METABOLIC PNL TOTAL CA: CPT | Performed by: PHYSICIAN ASSISTANT

## 2020-06-29 PROCEDURE — 25010000002 HEPARIN (PORCINE) PER 1000 UNITS: Performed by: INTERNAL MEDICINE

## 2020-06-29 PROCEDURE — 85610 PROTHROMBIN TIME: CPT | Performed by: PHYSICIAN ASSISTANT

## 2020-06-29 PROCEDURE — 85025 COMPLETE CBC W/AUTO DIFF WBC: CPT | Performed by: PHYSICIAN ASSISTANT

## 2020-06-29 PROCEDURE — C1894 INTRO/SHEATH, NON-LASER: HCPCS | Performed by: INTERNAL MEDICINE

## 2020-06-29 PROCEDURE — 96376 TX/PRO/DX INJ SAME DRUG ADON: CPT

## 2020-06-29 PROCEDURE — 93005 ELECTROCARDIOGRAM TRACING: CPT | Performed by: EMERGENCY MEDICINE

## 2020-06-29 PROCEDURE — 84484 ASSAY OF TROPONIN QUANT: CPT | Performed by: PHYSICIAN ASSISTANT

## 2020-06-29 PROCEDURE — 71275 CT ANGIOGRAPHY CHEST: CPT

## 2020-06-29 PROCEDURE — C1760 CLOSURE DEV, VASC: HCPCS | Performed by: INTERNAL MEDICINE

## 2020-06-29 PROCEDURE — 63710000001 SERTRALINE 100 MG TABLET: Performed by: INTERNAL MEDICINE

## 2020-06-29 PROCEDURE — 63710000001 ASPIRIN 81 MG TABLET DELAYED-RELEASE: Performed by: INTERNAL MEDICINE

## 2020-06-29 RX ORDER — FAMOTIDINE 10 MG
10 TABLET ORAL 2 TIMES DAILY
COMMUNITY
End: 2022-07-07

## 2020-06-29 RX ORDER — MIDAZOLAM HYDROCHLORIDE 1 MG/ML
INJECTION INTRAMUSCULAR; INTRAVENOUS AS NEEDED
Status: DISCONTINUED | OUTPATIENT
Start: 2020-06-29 | End: 2020-06-29 | Stop reason: HOSPADM

## 2020-06-29 RX ORDER — ATORVASTATIN CALCIUM 40 MG/1
40 TABLET, FILM COATED ORAL NIGHTLY
Status: DISCONTINUED | OUTPATIENT
Start: 2020-06-29 | End: 2020-06-29 | Stop reason: HOSPADM

## 2020-06-29 RX ORDER — HEPARIN SODIUM 10000 [USP'U]/100ML
9.2 INJECTION, SOLUTION INTRAVENOUS
Status: DISCONTINUED | OUTPATIENT
Start: 2020-06-29 | End: 2020-06-29 | Stop reason: HOSPADM

## 2020-06-29 RX ORDER — DEXTROSE MONOHYDRATE 25 G/50ML
25 INJECTION, SOLUTION INTRAVENOUS
Status: DISCONTINUED | OUTPATIENT
Start: 2020-06-29 | End: 2020-06-29 | Stop reason: HOSPADM

## 2020-06-29 RX ORDER — NICOTINE POLACRILEX 4 MG
15 LOZENGE BUCCAL
Status: DISCONTINUED | OUTPATIENT
Start: 2020-06-29 | End: 2020-06-29 | Stop reason: HOSPADM

## 2020-06-29 RX ORDER — ACETAMINOPHEN 325 MG/1
650 TABLET ORAL EVERY 4 HOURS PRN
Status: DISCONTINUED | OUTPATIENT
Start: 2020-06-29 | End: 2020-06-29 | Stop reason: HOSPADM

## 2020-06-29 RX ORDER — HEPARIN SODIUM 1000 [USP'U]/ML
4000 INJECTION, SOLUTION INTRAVENOUS; SUBCUTANEOUS ONCE
Status: COMPLETED | OUTPATIENT
Start: 2020-06-29 | End: 2020-06-29

## 2020-06-29 RX ORDER — ASPIRIN 81 MG/1
81 TABLET ORAL DAILY
Status: DISCONTINUED | OUTPATIENT
Start: 2020-06-29 | End: 2020-06-29 | Stop reason: HOSPADM

## 2020-06-29 RX ORDER — SODIUM CHLORIDE 0.9 % (FLUSH) 0.9 %
10 SYRINGE (ML) INJECTION EVERY 12 HOURS SCHEDULED
Status: DISCONTINUED | OUTPATIENT
Start: 2020-06-29 | End: 2020-06-29 | Stop reason: HOSPADM

## 2020-06-29 RX ORDER — HEPARIN SODIUM 1000 [USP'U]/ML
30 INJECTION, SOLUTION INTRAVENOUS; SUBCUTANEOUS AS NEEDED
Status: DISCONTINUED | OUTPATIENT
Start: 2020-06-29 | End: 2020-06-29 | Stop reason: SDUPTHER

## 2020-06-29 RX ORDER — FENTANYL CITRATE 50 UG/ML
INJECTION, SOLUTION INTRAMUSCULAR; INTRAVENOUS AS NEEDED
Status: DISCONTINUED | OUTPATIENT
Start: 2020-06-29 | End: 2020-06-29 | Stop reason: HOSPADM

## 2020-06-29 RX ORDER — FAMOTIDINE 20 MG/1
20 TABLET, FILM COATED ORAL DAILY
Status: DISCONTINUED | OUTPATIENT
Start: 2020-06-29 | End: 2020-06-29 | Stop reason: HOSPADM

## 2020-06-29 RX ORDER — CHOLECALCIFEROL (VITAMIN D3) 125 MCG
5 CAPSULE ORAL NIGHTLY PRN
Status: DISCONTINUED | OUTPATIENT
Start: 2020-06-29 | End: 2020-06-29 | Stop reason: HOSPADM

## 2020-06-29 RX ORDER — SODIUM CHLORIDE 0.9 % (FLUSH) 0.9 %
10 SYRINGE (ML) INJECTION AS NEEDED
Status: DISCONTINUED | OUTPATIENT
Start: 2020-06-29 | End: 2020-06-29 | Stop reason: HOSPADM

## 2020-06-29 RX ORDER — SERTRALINE HYDROCHLORIDE 100 MG/1
200 TABLET, FILM COATED ORAL DAILY
Status: DISCONTINUED | OUTPATIENT
Start: 2020-06-29 | End: 2020-06-29 | Stop reason: HOSPADM

## 2020-06-29 RX ORDER — HEPARIN SODIUM 1000 [USP'U]/ML
60 INJECTION, SOLUTION INTRAVENOUS; SUBCUTANEOUS AS NEEDED
Status: DISCONTINUED | OUTPATIENT
Start: 2020-06-29 | End: 2020-06-29 | Stop reason: SDUPTHER

## 2020-06-29 RX ORDER — LIDOCAINE HYDROCHLORIDE 10 MG/ML
INJECTION, SOLUTION EPIDURAL; INFILTRATION; INTRACAUDAL; PERINEURAL AS NEEDED
Status: DISCONTINUED | OUTPATIENT
Start: 2020-06-29 | End: 2020-06-29 | Stop reason: HOSPADM

## 2020-06-29 RX ADMIN — METOPROLOL TARTRATE 25 MG: 25 TABLET, FILM COATED ORAL at 08:59

## 2020-06-29 RX ADMIN — HEPARIN SODIUM 4000 UNITS: 1000 INJECTION, SOLUTION INTRAVENOUS; SUBCUTANEOUS at 04:00

## 2020-06-29 RX ADMIN — SODIUM CHLORIDE, PRESERVATIVE FREE 10 ML: 5 INJECTION INTRAVENOUS at 09:03

## 2020-06-29 RX ADMIN — ASPIRIN 81 MG: 81 TABLET, COATED ORAL at 08:59

## 2020-06-29 RX ADMIN — IOPAMIDOL 80 ML: 755 INJECTION, SOLUTION INTRAVENOUS at 00:37

## 2020-06-29 RX ADMIN — HEPARIN SODIUM 9.2 UNITS/KG/HR: 10000 INJECTION, SOLUTION INTRAVENOUS at 04:00

## 2020-06-29 RX ADMIN — FAMOTIDINE 20 MG: 20 TABLET, FILM COATED ORAL at 08:59

## 2020-06-29 RX ADMIN — SERTRALINE HYDROCHLORIDE 200 MG: 100 TABLET, FILM COATED ORAL at 08:59

## 2020-06-30 ENCOUNTER — DOCUMENTATION (OUTPATIENT)
Dept: CARDIAC REHAB | Facility: HOSPITAL | Age: 65
End: 2020-06-30

## 2020-10-26 ENCOUNTER — OFFICE VISIT (OUTPATIENT)
Dept: ENDOCRINOLOGY | Facility: CLINIC | Age: 65
End: 2020-10-26

## 2020-10-26 VITALS
OXYGEN SATURATION: 98 % | HEIGHT: 75 IN | RESPIRATION RATE: 16 BRPM | WEIGHT: 250.4 LBS | TEMPERATURE: 96.9 F | DIASTOLIC BLOOD PRESSURE: 82 MMHG | BODY MASS INDEX: 31.13 KG/M2 | HEART RATE: 62 BPM | SYSTOLIC BLOOD PRESSURE: 144 MMHG

## 2020-10-26 DIAGNOSIS — E11.65 TYPE 2 DIABETES MELLITUS WITH HYPERGLYCEMIA, WITH LONG-TERM CURRENT USE OF INSULIN (HCC): Primary | ICD-10-CM

## 2020-10-26 DIAGNOSIS — Z96.41 PRESENCE OF INSULIN PUMP: ICD-10-CM

## 2020-10-26 DIAGNOSIS — Z79.4 TYPE 2 DIABETES MELLITUS WITH HYPERGLYCEMIA, WITH LONG-TERM CURRENT USE OF INSULIN (HCC): Primary | ICD-10-CM

## 2020-10-26 PROCEDURE — 95251 CONT GLUC MNTR ANALYSIS I&R: CPT | Performed by: PHYSICIAN ASSISTANT

## 2020-10-26 PROCEDURE — 99213 OFFICE O/P EST LOW 20 MIN: CPT | Performed by: PHYSICIAN ASSISTANT

## 2020-10-26 RX ORDER — PROPRANOLOL HYDROCHLORIDE 40 MG/1
40 TABLET ORAL 2 TIMES DAILY
COMMUNITY
End: 2021-01-26

## 2020-10-26 RX ORDER — INSULIN GLARGINE 100 [IU]/ML
INJECTION, SOLUTION SUBCUTANEOUS
COMMUNITY
End: 2022-07-07

## 2020-10-26 RX ORDER — LOSARTAN POTASSIUM 25 MG/1
25 TABLET ORAL DAILY
COMMUNITY

## 2020-10-26 NOTE — PROGRESS NOTES
"     Office Note      Date: 10/26/2020  Patient Name: Milind Pack  MRN: [id number]  : 1955    Chief Complaint   Patient presents with   • Diabetes       History of Present Illness:   Milind Pack is a 65 y.o. male who presents today for type 2 diabetes. He remains on insulin and metformin.  He remains onTandem X2 insulin pump and Dexcom G6. He is using the Control-IQ program. He notes occasional hypoglycemia. He denies any severe hypoglycemia.  He notes weight gain.  He denies any sores on his feet. Eye exam up to date. He continues to be treated with injections - he reports there has been improvement.  He remains on statin and ASA.  ACE-I was changed to ARB due to cough.  The A1c last month was 7.1%.      Subjective      Review of Systems:   Review of Systems   Constitutional: Negative for appetite change, chills, fatigue and fever.        Weight gain   Respiratory: Negative for cough, shortness of breath and wheezing.    Cardiovascular: Negative for chest pain, palpitations and leg swelling.   Gastrointestinal: Negative for abdominal pain, constipation, diarrhea, nausea and vomiting.   Endocrine: Negative for cold intolerance, heat intolerance, polydipsia, polyphagia and polyuria.   Neurological: Negative for tremors, syncope, weakness, numbness and headaches.       The following portions of the patient's history were reviewed and updated as appropriate: allergies, current medications, past family history, past medical history, past social history, past surgical history and problem list.    Objective     Vitals:    10/26/20 1338   BP: 144/82   Pulse: 62   Resp: 16   Temp: 96.9 °F (36.1 °C)   TempSrc: Infrared   SpO2: 98%   Weight: 114 kg (250 lb 6.4 oz)   Height: 190.5 cm (75\")     Body mass index is 31.3 kg/m².    Physical Exam  Vitals signs reviewed.   Constitutional:       General: He is not in acute distress.     Appearance: Normal appearance.   Neurological:      Mental Status: He is alert. "   Psychiatric:         Mood and Affect: Mood and affect normal.       Current Outpatient Medications   Medication Instructions   • aspirin 81 mg, Oral, Daily   • atorvastatin (LIPITOR) 40 mg, Oral, Daily   • famotidine (PEPCID) 10 mg, Oral, 2 Times Daily   • insulin aspart (novoLOG FLEXPEN) 100 UNIT/ML solution pen-injector sc pen Subcutaneous, As Needed, Uses as a backup for insulin pump   • insulin aspart (NovoLOG) 100 UNIT/ML patient supplied pump Subcutaneous, Continuous, 1.2 units/hr basal rate1.25 units correction1:5 carbs Target     • insulin glargine (LANTUS) 100 UNIT/ML injection 30 units once daily PRN off insulin pump   • losartan (COZAAR) 25 mg, Oral, Daily   • metFORMIN (GLUCOPHAGE) 1,000 mg, Oral, 2 Times Daily With Meals   • Omega-3 Fatty Acids (FISH OIL) 1000 MG capsule capsule 1 capsule, Oral, Daily   • propranolol (INDERAL) 40 mg, Oral, 2 Times Daily   • sertraline (ZOLOFT) 200 mg, Oral, Daily       Assessment / Plan      Assessment & Plan:  1. Type 2 diabetes mellitus with hyperglycemia, with long-term current use of insulin (CMS/[facility])  A1c improved.  Reviewed pump/CGM data and discussed with patient.  Sensor average 164 for the past 2 weeks, 62% time in target, 1% low.  Encouraged healthy dietary choices, regular physical activity.  No changes to pump settings.    2. Presence of insulin pump      3. Body mass index (BMI) 31.0-31.9, adult        Return in about 3 months (around 1/26/2021) for Next scheduled follow up.     ILSA Hardy  10/26/2020

## 2021-01-26 ENCOUNTER — OFFICE VISIT (OUTPATIENT)
Dept: ENDOCRINOLOGY | Facility: CLINIC | Age: 66
End: 2021-01-26

## 2021-01-26 VITALS
DIASTOLIC BLOOD PRESSURE: 86 MMHG | SYSTOLIC BLOOD PRESSURE: 140 MMHG | HEART RATE: 60 BPM | HEIGHT: 75 IN | BODY MASS INDEX: 31.71 KG/M2 | OXYGEN SATURATION: 96 % | TEMPERATURE: 97.1 F | WEIGHT: 255 LBS

## 2021-01-26 DIAGNOSIS — Z79.4 TYPE 2 DIABETES MELLITUS WITH HYPERGLYCEMIA, WITH LONG-TERM CURRENT USE OF INSULIN (HCC): Primary | Chronic | ICD-10-CM

## 2021-01-26 DIAGNOSIS — Z96.41 PRESENCE OF INSULIN PUMP: ICD-10-CM

## 2021-01-26 DIAGNOSIS — E11.65 TYPE 2 DIABETES MELLITUS WITH HYPERGLYCEMIA, WITH LONG-TERM CURRENT USE OF INSULIN (HCC): Primary | Chronic | ICD-10-CM

## 2021-01-26 PROCEDURE — 99213 OFFICE O/P EST LOW 20 MIN: CPT | Performed by: PHYSICIAN ASSISTANT

## 2021-01-26 PROCEDURE — 95251 CONT GLUC MNTR ANALYSIS I&R: CPT | Performed by: PHYSICIAN ASSISTANT

## 2021-01-26 RX ORDER — CARVEDILOL 12.5 MG/1
12.5 TABLET ORAL 2 TIMES DAILY
COMMUNITY

## 2021-01-26 NOTE — PROGRESS NOTES
"     Office Note      Date: 2021  Patient Name: Milind Pack  MRN: [id number]  : 1955    Chief Complaint   Patient presents with   • Diabetes       History of Present Illness:   Milind Pack is a 65 y.o. male who presents today for insulin-dependent type 2 diabetes. He remains on insulin pump plus metformin.  He is using the Tandem X2 pump and Dexcom G6.  He is using the Control-IQ program.  He reports more frequent hypoglycemia.  He denies any severe hypoglycemia.  He reports that he has been more active.  He reports back and hips have been very stiff.  He is starting PT later this week.  He denies any sores on his feet.  He reports that feet hurt when walking.  [facility]  Eye exam up-to-date.  He remains on statin, losartan, and aspirin.  [facility] 2021.  The A1c was 7.2%.  Creatinine 1.26.    Subjective       The following portions of the patient's history were reviewed and updated as appropriate: allergies, current medications, past family history, past medical history, past social history, past surgical history and problem list.    Objective     Vitals:    21 1248   BP: 140/86   BP Location: Left arm   Patient Position: Sitting   Cuff Size: Adult   Pulse: 60   Temp: 97.1 °F (36.2 °C)   TempSrc: Infrared   SpO2: 96%   Weight: 116 kg (255 lb)   Height: 190.5 cm (75\")   PainSc: 0-No pain     Body mass index is 31.87 kg/m².    Physical Exam  Vitals signs reviewed.   Constitutional:       General: He is not in acute distress.  Cardiovascular:      Pulses:           Dorsalis pedis pulses are 2+ on the right side and 2+ on the left side.        Posterior tibial pulses are 2+ on the right side and 2+ on the left side.   Musculoskeletal:      Right foot: No deformity.      Left foot: Deformity (overlapping toe) present.   Feet:      Right foot:      Protective Sensation: 10 sites tested. 10 sites sensed.      Skin integrity: Callus (mild) present.      " Toenail Condition: Fungal disease present.     Left foot:      Protective Sensation: 10 sites tested. 10 sites sensed.      Skin integrity: Callus (mild) present.      Toenail Condition: Fungal disease present.  Neurological:      Mental Status: He is alert and oriented to person, place, and time.   Psychiatric:         Mood and Affect: Mood and affect normal.         HEMOGLOBIN A1C  Lab Results   Component Value Date    HGBA1C 7.70 (H) 06/29/2020       CMP  Lab Results   Component Value Date    GLUCOSE 110 (H) 06/29/2020    BUN 32 (H) 06/29/2020    CREATININE 1.11 06/29/2020    EGFRIFNONA 66 06/29/2020    BCR 28.8 (H) 06/29/2020    K 4.3 06/29/2020    CO2 24.0 06/29/2020    CALCIUM 9.0 06/29/2020    AST 24 06/28/2020    ALT 22 06/28/2020       LIPID PANEL  Lab Results   Component Value Date    CHOL 95 06/29/2020    TRIG 111 06/29/2020    HDL 34 (L) 06/29/2020    LDL 39 06/29/2020       TSH  Lab Results   Component Value Date    TSH 0.911 06/29/2020       Current Outpatient Medications   Medication Instructions   • aspirin 81 mg, Oral, Daily   • atorvastatin (LIPITOR) 40 mg, Oral, Daily   • carvedilol (COREG) 12.5 mg, Oral, 2 Times Daily   • famotidine (PEPCID) 10 mg, Oral, 2 Times Daily   • insulin aspart (novoLOG FLEXPEN) 100 UNIT/ML solution pen-injector sc pen Subcutaneous, As Needed, Uses as a backup for insulin pump   • insulin aspart (NovoLOG) 100 UNIT/ML patient supplied pump Subcutaneous, Continuous, 1.2 units/hr basal rate1.25 units correction1:5 carbs Target     • insulin glargine (LANTUS) 100 UNIT/ML injection 30 units once daily PRN off insulin pump   • losartan (COZAAR) 25 mg, Oral, Daily   • metFORMIN (GLUCOPHAGE) 1,000 mg, Oral, 2 Times Daily With Meals   • Omega-3 Fatty Acids (FISH OIL) 1000 MG capsule capsule 1 capsule, Oral, Daily   • sertraline (ZOLOFT) 200 mg, Oral, Daily       Assessment / Plan      Assessment & Plan:  1. Type 2 diabetes mellitus with hyperglycemia, with long-term current  use of insulin (CMS/[facility])  A1c not too bad.  Reviewed pump/CGM data and discussed with patient.  Sensor average 145 for the past 2 weeks (glucose variable from ), 79% time in range, 1% low.  We discussed decreasing boluses if he is going to be exercising within the next 1.5 hours.  Do not bolus with small snacks, especially if he has eaten and bolused within the past couple of hours.  Continue insulin in pump and metformin.    2. Presence of insulin pump      Return in about 3 months (around 4/26/2021) for Next scheduled follow up.     ILSA Hardy  01/26/2021

## 2021-01-29 LAB — HBA1C MFR BLD: 7.2 %

## 2021-03-03 ENCOUNTER — APPOINTMENT (OUTPATIENT)
Dept: CARDIOLOGY | Facility: HOSPITAL | Age: 66
End: 2021-03-03

## 2021-03-03 ENCOUNTER — HOSPITAL ENCOUNTER (EMERGENCY)
Facility: HOSPITAL | Age: 66
Discharge: HOME | End: 2021-03-03
Attending: EMERGENCY MEDICINE | Admitting: EMERGENCY MEDICINE

## 2021-03-03 VITALS
HEART RATE: 72 BPM | OXYGEN SATURATION: 98 % | SYSTOLIC BLOOD PRESSURE: 163 MMHG | HEIGHT: 75 IN | DIASTOLIC BLOOD PRESSURE: 73 MMHG | WEIGHT: 255 LBS | TEMPERATURE: 98 F | BODY MASS INDEX: 31.71 KG/M2 | RESPIRATION RATE: 18 BRPM

## 2021-03-03 DIAGNOSIS — I82.402 ACUTE DEEP VEIN THROMBOSIS (DVT) OF LEFT LOWER EXTREMITY, UNSPECIFIED VEIN (HCC): ICD-10-CM

## 2021-03-03 DIAGNOSIS — M79.605 LEG PAIN, POSTERIOR, LEFT: Primary | ICD-10-CM
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
## 2021-03-03 PROCEDURE — 93971 EXTREMITY STUDY: CPT | Performed by: INTERNAL MEDICINE

## 2021-03-03 PROCEDURE — 99282 EMERGENCY DEPT VISIT SF MDM: CPT

## 2021-03-03 PROCEDURE — 93971 EXTREMITY STUDY: CPT

## 2021-03-03 RX ADMIN — APIXABAN 10 MG: 5 TABLET, FILM COATED ORAL at 20:09

## 2021-03-04 LAB
BH CV ECHO MEAS - BSA(HAYCOCK): 2.5 M^2
BH CV ECHO MEAS - BSA: 2.4 M^2
BH CV ECHO MEAS - BZI_BMI: 31.9 KILOGRAMS/M^2
BH CV ECHO MEAS - BZI_METRIC_HEIGHT: 190.5 CM
BH CV ECHO MEAS - BZI_METRIC_WEIGHT: 115.7 KG
BH CV LOW VAS LEFT PERONEAL VESSEL: 1
BH CV LOW VAS LEFT POSTERIOR TIBIAL VESSEL: 1
BH CV LOW VAS LEFT SOLEAL VESSEL: 1
BH CV LOWER VASCULAR LEFT COMMON FEMORAL AUGMENT: NORMAL
BH CV LOWER VASCULAR LEFT COMMON FEMORAL COMPRESS: NORMAL
BH CV LOWER VASCULAR LEFT COMMON FEMORAL PHASIC: NORMAL
BH CV LOWER VASCULAR LEFT COMMON FEMORAL SPONT: NORMAL
BH CV LOWER VASCULAR LEFT DISTAL FEMORAL AUGMENT: NORMAL
BH CV LOWER VASCULAR LEFT DISTAL FEMORAL COMPRESS: NORMAL
BH CV LOWER VASCULAR LEFT DISTAL FEMORAL PHASIC: NORMAL
BH CV LOWER VASCULAR LEFT DISTAL FEMORAL SPONT: NORMAL
BH CV LOWER VASCULAR LEFT GASTRONEMIUS COMPRESS: NORMAL
BH CV LOWER VASCULAR LEFT GREATER SAPH AK COMPRESS: NORMAL
BH CV LOWER VASCULAR LEFT GREATER SAPH BK COMPRESS: NORMAL
BH CV LOWER VASCULAR LEFT LESSER SAPH COMPRESS: NORMAL
BH CV LOWER VASCULAR LEFT MID FEMORAL AUGMENT: NORMAL
BH CV LOWER VASCULAR LEFT MID FEMORAL COMPRESS: NORMAL
BH CV LOWER VASCULAR LEFT MID FEMORAL PHASIC: NORMAL
BH CV LOWER VASCULAR LEFT MID FEMORAL SPONT: NORMAL
BH CV LOWER VASCULAR LEFT PERONEAL COMPRESS: NORMAL
BH CV LOWER VASCULAR LEFT PERONEAL THROMBUS: NORMAL
BH CV LOWER VASCULAR LEFT POPLITEAL AUGMENT: NORMAL
BH CV LOWER VASCULAR LEFT POPLITEAL COMPRESS: NORMAL
BH CV LOWER VASCULAR LEFT POPLITEAL PHASIC: NORMAL
BH CV LOWER VASCULAR LEFT POPLITEAL SPONT: NORMAL
BH CV LOWER VASCULAR LEFT POSTERIOR TIBIAL COMPRESS: NORMAL
BH CV LOWER VASCULAR LEFT POSTERIOR TIBIAL THROMBUS: NORMAL
BH CV LOWER VASCULAR LEFT PROFUNDA FEMORAL AUGMENT: NORMAL
BH CV LOWER VASCULAR LEFT PROFUNDA FEMORAL COMPRESS: NORMAL
BH CV LOWER VASCULAR LEFT PROFUNDA FEMORAL PHASIC: NORMAL
BH CV LOWER VASCULAR LEFT PROFUNDA FEMORAL SPONT: NORMAL
BH CV LOWER VASCULAR LEFT PROXIMAL FEMORAL AUGMENT: NORMAL
BH CV LOWER VASCULAR LEFT PROXIMAL FEMORAL COMPRESS: NORMAL
BH CV LOWER VASCULAR LEFT PROXIMAL FEMORAL PHASIC: NORMAL
BH CV LOWER VASCULAR LEFT PROXIMAL FEMORAL SPONT: NORMAL
BH CV LOWER VASCULAR LEFT SAPHENOFEMORAL JUNCTION AUGMENT: NORMAL
BH CV LOWER VASCULAR LEFT SAPHENOFEMORAL JUNCTION COMPRESS: NORMAL
BH CV LOWER VASCULAR LEFT SAPHENOFEMORAL JUNCTION PHASIC: NORMAL
BH CV LOWER VASCULAR LEFT SAPHENOFEMORAL JUNCTION SPONT: NORMAL
BH CV LOWER VASCULAR LEFT SOLEAL COMPRESS: NORMAL
BH CV LOWER VASCULAR LEFT SOLEAL THROMBUS: NORMAL
BH CV LOWER VASCULAR RIGHT COMMON FEMORAL AUGMENT: NORMAL
BH CV LOWER VASCULAR RIGHT COMMON FEMORAL COMPRESS: NORMAL
BH CV LOWER VASCULAR RIGHT COMMON FEMORAL PHASIC: NORMAL
BH CV LOWER VASCULAR RIGHT COMMON FEMORAL SPONT: NORMAL

## 2021-03-04 NOTE — ED PROVIDER NOTES
Subjective   Patient is a 66-year-old male presents emergency room today for evaluation of left knee pain following a fall.  Patient states that last week when the weather is bad he slipped and fell causing him to have an abrasion to his anterior knee.  He states that since he fall he has had increased pain in his left knee.  He states that he has been icing the injury which helps to alleviate some of the pain but that he has noticed recently that there has been increased swelling and pain behind his knee.  He shares the pain in the front of his knee has been getting better but he feels like the pain behind his knee is getting worse.  He reports that pain is aggravated with movement and that he has found nothing specific to help alleviate the pain.  He denies any additional associated symptoms.      History provided by:  Patient  Leg Pain  Location:  Leg and knee  Time since incident:  1 week  Leg location:  L leg  Knee location:  L knee  Pain details:     Quality:  Aching and throbbing    Radiates to:  Does not radiate    Severity:  Moderate    Onset quality:  Gradual    Duration:  1 week    Timing:  Constant    Progression:  Waxing and waning  Chronicity:  New  Relieved by:  Ice, rest and immobilization  Worsened by:  Activity and bearing weight  Ineffective treatments:  Compression, ice, immobilization and rest      Review of Systems   Cardiovascular: Positive for leg swelling.   Musculoskeletal: Positive for arthralgias, joint swelling and myalgias.   All other systems reviewed and are negative.      Past Medical History:   Diagnosis Date   • Hypercholesterolemia    • Hypertension    • Nephrolithiasis    • S/P aortic valve replacement with porcine valve    • Type 2 diabetes mellitus treated with insulin (CMS/[facility])        Allergies   Allergen Reactions   • Lisinopril Cough       Past Surgical History:   Procedure Laterality Date   • ADRENAL GLAND SURGERY     • [facility]IC VALVE [facility]  [facility]   • CARDIAC  CATHETERIZATION N/A 2020    Procedure: LEFT HEART CATH;  Surgeon: Isidoro Rahman MD;  Location: [facility] CATH INVASIVE LOCATION;  Service: Cardiology;  Laterality: N/A;   • CATARACT EXTRACTION Bilateral    • TOE SURGERY Left     first toe x2 removed joint        Family History   Problem Relation Age of Onset   • Cancer Mother         breast and kidney   • Other Mother         Tobacco use   • Obesity Mother    • Diabetes Mother    • Heart disease Father    • Heart failure Father    • Heart defect Father    • Diabetes Father    • Diabetes type II Brother    • Osteoarthritis Brother    • No Known Problems Maternal Grandmother    • No Known Problems Maternal Grandfather    • No Known Problems Paternal Grandmother    • Heart failure Paternal Grandfather        Social History     Socioeconomic History   • Marital status:      Spouse name: Not on file   • Number of children: Not on file   • Years of education: Not on file   • Highest education level: Not on file   Tobacco Use   • Smoking status: Former Smoker     Packs/day: 1.50     Years: 8.00     Pack years: 12.00     Types: Cigarettes     Quit date:      Years since quittin.2   • Smokeless tobacco: Never Used   Substance and Sexual Activity   • Alcohol use: No   • Drug use: No   • Sexual activity: Defer   Social History Narrative    Caffeine: 3 cups coffee, 6 other caffeine beverages daily           Objective   Physical Exam  Vitals signs and nursing note reviewed.   Constitutional:       General: He is not in acute distress.     Appearance: Normal appearance. He is well-developed. He is not toxic-appearing.   HENT:      Head: Normocephalic and atraumatic.      Mouth/Throat:      Mouth: Mucous membranes are moist.   Eyes:      Extraocular Movements: Extraocular movements intact.      Conjunctiva/sclera: Conjunctivae normal.   Neck:      Musculoskeletal: Normal range of motion and neck supple.   Cardiovascular:      Rate and Rhythm: Normal rate  and regular rhythm.      Pulses: Normal pulses.   Pulmonary:      Effort: Pulmonary effort is normal. No respiratory distress.      Breath sounds: Normal breath sounds.   Abdominal:      General: There is no distension.   Musculoskeletal: Normal range of motion.         General: Swelling and tenderness present. No deformity.      Left knee: He exhibits swelling. Tenderness found.      Comments: Patient has superficial abrasion to left knee.  Tenderness to palpation in popliteal fossa   Skin:     General: Skin is warm and dry.   Neurological:      Mental Status: He is alert and oriented to person, place, and time.   Psychiatric:         Behavior: Behavior normal.         Thought Content: Thought content normal.         Judgment: Judgment normal.         Procedures           ED Course  ED Course as of Mar 03 2158   Wed Mar 03, 2021   2156 Patient presents to ED for evaluation of left leg pain following a fall last week.  No acute or emergent abnormalities appreciated on physical exam.  Duplex venous Doppler of left lower extremity positive for DVT.  Patient received initial dose of Eliquis in the emergency department and prescribed Eliquis starter pack for further anticoagulation.  Patient is afebrile, nontoxic appearing, vital signs stable and able to maintain O2 sats of 98% on room air. Patient will be discharged home with prescription for Eliquis starter pack and outpatient follow up to their primary care provider as recommended. Patient is agreeable to plan of care of outpatient follow up, provided clear return precautions and demonstrated understanding.    [JG]      ED Course User Index  [JG] Roscoe Phan PA      Recent Results (from the past 24 hour(s))   Duplex Venous Lower Extremity - Left    Collection Time: 03/03/21  7:32 PM   Result Value Ref Range    BSA 2.4 m^2     CV ECHO LEON - BZI_BMI 31.9 kilograms/m^2     CV ECHO LEON - BSA([place]) 2.5 m^2     CV ECHO LEON - BZI_METRIC_WEIGHT 115.7 kg      CV ECHO LEON - BZI_METRIC_HEIGHT 190.5 cm    Left Posterior Tibial Vessel 1     Left Peroneal Vessel 1     Right Common Femoral Spont Y     Right Common Femoral Phasic Y     Right Common Femoral Augment Y     Right Common Femoral Compress C     Left Common Femoral Spont Y     Left Common Femoral Phasic Y     Left Common Femoral Augment Y     Left Common Femoral Compress C     Left Saphenofemoral Junction Spont Y     Left Saphenofemoral Junction Phasic Y     Left Saphenofemoral Junction Augment Y     Left Saphenofemoral Junction Compress C     Left Profunda Femoral Spont Y     Left Profunda Femoral Phasic Y     Left Profunda Femoral Augment Y     Left Profunda Femoral Compress C     Left Proximal Femoral Spont Y     Left Proximal Femoral Phasic Y     Left Proximal Femoral Augment Y     Left Proximal Femoral Compress C     Left Mid Femoral Spont Y     Left Mid Femoral Phasic Y     Left Mid Femoral Augment Y     Left Mid Femoral Compress C     Left Distal Femoral Spont Y     Left Distal Femoral Phasic Y     Left Distal Femoral Augment Y     Left Distal Femoral Compress C     Left Popliteal Spont Y     Left Popliteal Phasic Y     Left Popliteal Augment Y     Left Popliteal Compress C     Left Posterior Tibial Compress N     Left Posterior Tibial Thrombus A     Left Peroneal Compress N     Left Peroneal Thrombus C     Left GastronemiusSoleal Compress C     Left Greater Saph AK Compress C     Left Greater Saph BK Compress C     Left Lesser Saph Compress C     Lt soleal vessel 1     Lt soleal compress N     Lt soleal thrombus A      Note: In addition to lab results from this visit, the labs listed above may include labs taken at another facility or during a different encounter within the last 24 hours. Please correlate lab times with ED admission and discharge times for further clarification of the services performed during this visit.    No orders to display     Vitals:    03/03/21 1803 03/03/21 1932   BP: 163/73    BP  "Location: Left arm    Patient Position: Sitting    Pulse: 72    Resp: 18    Temp: 98 °F (36.7 °C)    TempSrc: Oral    SpO2: 98%    Weight: 116 kg (255 lb) 116 kg (255 lb)   Height: 190.5 cm (75\") 190 cm (74.8\")     Medications   apixaban (ELIQUIS) tablet 10 mg (10 mg Oral Given 3/3/21 2009)     ECG/EMG Results (last 24 hours)     ** No results found for the last 24 hours. **        No orders to display     DISCHARGE    Patient discharged in stable condition.    Reviewed implications of results, diagnosis, meds, responsibility to follow up, warning signs and symptoms of possible worsening, potential complications and reasons to return to ER.    Patient/Family voiced understanding of above instructions.    Discussed plan for discharge, as there is no emergent indication for admission.  Pt/family is agreeable and understands need for follow up and possible repeat testing.  Pt/family is aware that discharge does not mean that nothing is wrong but that it indicates no emergency is currently present that requires admission and they must continue care with follow-up as given below or with a physician of their choice.     FOLLOW-UP  Jessica Metcalf MD  [facility] [place] [address]  [phone]    Schedule an appointment as soon as possible for a visit       [facility] Emergency Department  [address] [facility] [address] [phone]  Go to   If symptoms worsen         Medication List      New Prescriptions    Apixaban Starter Pack tablet therapy pack  Take two 5 mg tablets by mouth every 12 hours for 7 days. Followed by one 5 mg tablet every 12 hours. (Dispense starter pack if available)           Where to Get Your Medications      These medications were sent to [facility] [address] [place] [address] - [address] [facility] - [phone]  - [phone] FX  [address] [facility] [address]    Phone: [phone]   · Apixaban Starter Pack tablet therapy pack             "                              MDM  Number of Diagnoses or Management Options  Acute deep vein thrombosis (DVT) of left lower extremity, unspecified vein (CMS/HCC): new and requires workup  Fall, initial encounter: new and requires workup  Leg pain, posterior, left: new and requires workup  Risk of Complications, Morbidity, and/or Mortality  Presenting problems: high  Diagnostic procedures: moderate  Management options: moderate    Patient Progress  Patient progress: stable      Final diagnoses:   Leg pain, posterior, left   Acute deep vein thrombosis (DVT) of left lower extremity, unspecified vein (CMS/HCC)   Fall, initial encounter            Roscoe Phan PA  03/03/21 2775

## 2021-03-07 ENCOUNTER — NURSE TRIAGE (OUTPATIENT)
Dept: CALL CENTER | Facility: HOSPITAL | Age: 66
End: 2021-03-07

## 2021-03-08 NOTE — TELEPHONE ENCOUNTER
"States he was [facility] 3/3/21. He was prescribed Eliquis. States he has been seeing on TV that you shouldn't take eliquis with an artifical heart valve. States he has a \"pig valve\" and wants to know if it is alright? Explained if the provider prescribed it, that it should be okay as they should have taken it into consideration when prescribing medication as his porcine valve is noted in his medical history in his chart. Explained he should call his PCP in the morning to discuss this with them if he is concerned. He verbalized understanding.     Reason for Disposition  • [1] Caller has NON-URGENT medication question about med that PCP prescribed AND [2] triager unable to answer question    Additional Information  • Negative: Drug overdose and triager unable to answer question  • Negative: Caller requesting information unrelated to medicine  • Negative: Caller requesting a prescription for Strep throat and has a positive culture result  • Negative: Rash while taking a medication or within 3 days of stopping it  • Negative: Immunization reaction suspected  • Negative: [1] Asthma and [2] having symptoms of asthma (cough, wheezing, etc.)  • Negative: [1] Influenza symptoms AND [2] anti-viral med prescription request, such as Tamiflu  • Negative: [1] Symptom of illness (e.g., headache, abdominal pain, earache, vomiting) AND [2] more than mild  • Negative: MORE THAN A DOUBLE DOSE of a prescription or over-the-counter (OTC) drug  • Negative: [1] DOUBLE DOSE (an extra dose or lesser amount) of over-the-counter (OTC) drug AND [2] any symptoms (e.g., dizziness, nausea, pain, sleepiness)  • Negative: [1] DOUBLE DOSE (an extra dose or lesser amount) of prescription drug AND [2] any symptoms (e.g., dizziness, nausea, pain, sleepiness)  • Negative: Took another person's prescription drug  • Negative: [1] DOUBLE DOSE (an extra dose or lesser amount) of prescription drug AND [2] NO symptoms (Exception: a double dose of " "antibiotics)  • Negative: Diabetes drug error or overdose (e.g., took wrong type of insulin or took extra dose)  • Negative: [1] Request for URGENT new prescription or refill of \"essential\" medication (i.e., likelihood of harm to patient if not taken) AND [2] triager unable to fill per unit policy  • Negative: [1] Prescription not at pharmacy AND [2] was prescribed by PCP recently  • Negative: [1] Pharmacy calling with prescription questions AND [2] triager unable to answer question  • Negative: [1] Caller has URGENT medication question about med that PCP or specialist prescribed AND [2] triager unable to answer question    Answer Assessment - Initial Assessment Questions  1.   NAME of MEDICATION: \"What medicine are you calling about?\"      See note  2.   QUESTION: \"What is your question?\"      n/a  3.   PRESCRIBING HCP: \"Who prescribed it?\" Reason: if prescribed by specialist, call should be referred to that group.      n/a  4. SYMPTOMS: \"Do you have any symptoms?\"      n/a  5. SEVERITY: If symptoms are present, ask \"Are they mild, moderate or severe?\"      n/a  6.  PREGNANCY:  \"Is there any chance that you are pregnant?\" \"When was your last menstrual period?\"      n/a    Protocols used: MEDICATION QUESTION CALL-ADULT-      "

## 2021-05-14 ENCOUNTER — DOCUMENTATION (OUTPATIENT)
Dept: DIABETES SERVICES | Facility: HOSPITAL | Age: 66
End: 2021-05-14

## 2022-03-17 ENCOUNTER — TELEPHONE (OUTPATIENT)
Dept: NEUROLOGY | Facility: CLINIC | Age: 67
End: 2022-03-17

## 2022-03-17 ENCOUNTER — OFFICE VISIT (OUTPATIENT)
Dept: NEUROLOGY | Facility: CLINIC | Age: 67
End: 2022-03-17

## 2022-03-17 VITALS
HEART RATE: 76 BPM | TEMPERATURE: 96.9 F | SYSTOLIC BLOOD PRESSURE: 134 MMHG | WEIGHT: 243 LBS | BODY MASS INDEX: 31.18 KG/M2 | OXYGEN SATURATION: 98 % | DIASTOLIC BLOOD PRESSURE: 60 MMHG | HEIGHT: 74 IN

## 2022-03-17 DIAGNOSIS — R20.2 PARESTHESIA: Primary | ICD-10-CM

## 2022-03-17 DIAGNOSIS — M72.0 DUPUYTREN'S CONTRACTURE OF BOTH HANDS: ICD-10-CM

## 2022-03-17 DIAGNOSIS — R25.1 TREMOR: ICD-10-CM

## 2022-03-17 PROCEDURE — 99204 OFFICE O/P NEW MOD 45 MIN: CPT | Performed by: NURSE PRACTITIONER

## 2022-03-17 RX ORDER — UBIDECARENONE 75 MG
1000 CAPSULE ORAL DAILY
COMMUNITY

## 2022-03-17 RX ORDER — CHLORAL HYDRATE 500 MG
1 CAPSULE ORAL DAILY
COMMUNITY
End: 2022-03-17

## 2022-03-17 RX ORDER — PRIMIDONE 50 MG/1
50 TABLET ORAL
COMMUNITY
End: 2022-07-07

## 2022-03-17 RX ORDER — MULTIPLE VITAMINS W/ MINERALS TAB 9MG-400MCG
1 TAB ORAL DAILY
COMMUNITY

## 2022-03-17 NOTE — PROGRESS NOTES
Neuro Office Visit      Encounter Date: 2022   Patient Name: Milind Pack  : 1955   MRN: [id number]   PCP: Dr Metcalf  Referring Provider: Dr Lin  Chief Complaint:    Chief Complaint   Patient presents with   • Hand Pain       History of Present Illness: Milind Pack is a 67 y.o. male who is here today in Neurology for tremor and numbness of left hand    [facility]    Neuropathic pain  Complains of progressive pain in neck to bilat upper ext for years. Pain is intermittent and feels like an electric shock. Difficult to  coins and button shirt. Only area with numbness is the dip of right index finger. Uncontrolled type 2 DM for many years. Has insulin pump. Denies numbness in feet.    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 He has been referred here for a repeat EMG.      Tremor   Mild tremor left hand index finger only with activity. Can interfere with using his phone and eating. Taking Primidone.    Arthralgia  Had x-ray of right hand . Has deformity of right thumb MCP. Had similar bony mass removed from left foot as a young adult.       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 Walks with cane.    Labs: A1c 6.7 Has insulin pump.   PMH: aortic aneurysm, htn, gerd, hld, MDD, LOR, aortic stenosis, T2 DM. Has LPB with muscle spasms.  Subjective      Past Medical History:   Past Medical History:   Diagnosis Date   • Difficulty walking    • HL (hearing loss)    • Hypercholesterolemia    • Hypertension    • Nephrolithiasis    • Neuropathy in diabetes (HCC)    • S/P aortic valve replacement with porcine valve    • Sleep apnea    • Type 2 diabetes mellitus treated with insulin ([facility])        Past Surgical History:   Past Surgical History:    Procedure Laterality Date   • ADRENAL GLAND SURGERY     • AORTIC VALVE [facility]GERY      [facility]   • CARDIAC CATHETERIZATION N/A 2020    Procedure: LEFT HEART CATH;  Surgeon: Isidoro Rahman MD;  Location: [facility] INVASIVE LOCATION;  Service: Cardiology;  Laterality: N/A;   • CARDIAC VALVE REPLACEMENT     • CATARACT EXTRACTION Bilateral    • TOE SURGERY Left     first toe x2 removed joint        Family History:   Family History   Problem Relation Age of Onset   • Cancer Mother         breast and kidney   • Other Mother         Tobacco use   • Obesity Mother    • Diabetes Mother    • Heart disease Father    • Heart failure Father    • Heart defect Father    • Diabetes Father    • Diabetes type II Brother    • Osteoarthritis Brother    • No Known Problems Maternal Grandmother    • No Known Problems Maternal Grandfather    • No Known Problems Paternal Grandmother    • Heart failure Paternal Grandfather        Social History:   Social History     Socioeconomic History   • Marital status:    Tobacco Use   • Smoking status: Former Smoker     Packs/day: 1.50     Years: 8.00     Pack years: 12.00     Types: Cigarettes     Start date: 1963     Quit date: 1974     Years since quittin.2   • Smokeless tobacco: Never Used   Substance and Sexual Activity   • Alcohol use: Not Currently     Alcohol/week: 39.0 standard drinks     Types: 12 Glasses of wine, 24 Cans of beer, 2 Shots of liquor, 1 Drinks containing 0.5 oz of alcohol per week   • Drug use: No   • Sexual activity: Yes     Partners: Female       Medications:     Current Outpatient Medications:   •  aspirin 81 MG EC tablet, Take 81 mg by mouth Daily., Disp: , Rfl:   •  atorvastatin (LIPITOR) 80 MG tablet, Take 40 mg by mouth Daily., Disp: , Rfl:   •  carvedilol (COREG) 12.5 MG tablet, Take 12.5 mg by mouth 2 (Two) Times a Day., Disp: , Rfl:   •  Diclofenac Sodium (VOLTAREN) 1 % gel gel, Apply 4 g topically to the appropriate area as  directed 4 (Four) Times a Day As Needed., Disp: , Rfl:   •  famotidine (PEPCID) 10 MG tablet, Take 10 mg by mouth 2 (Two) Times a Day., Disp: , Rfl:   •  FIBER COMPLETE PO, Take  by mouth., Disp: , Rfl:   •  insulin aspart (novoLOG FLEXPEN) 100 UNIT/ML solution pen-injector sc pen, Inject  under the skin As Needed. Uses as a backup for insulin pump, Disp: , Rfl:   •  insulin aspart (NovoLOG) 100 UNIT/ML patient supplied pump, Inject  under the skin into the appropriate area as directed Continuous. 1.2 units/hr basal rate 1.25 units correction 1:5 carbs  Target , Disp: , Rfl:   •  insulin glargine (LANTUS) 100 UNIT/ML injection, 30 units once daily PRN off insulin pump, Disp: , Rfl:   •  losartan (COZAAR) 25 MG tablet, Take 25 mg by mouth Daily., Disp: , Rfl:   •  metFORMIN (GLUCOPHAGE) 1000 MG tablet, Take 1,000 mg by mouth 2 (Two) Times a Day With Meals., Disp: , Rfl:   •  multivitamin with minerals tablet tablet, Take 1 tablet by mouth Daily., Disp: , Rfl:   •  Omega-3 Fatty Acids (FISH OIL) 1000 MG capsule capsule, Take 1 capsule by mouth Daily., Disp: , Rfl:   •  Omega-3 Fatty Acids (fish oil) 1000 MG capsule capsule, Take 1 g by mouth Daily., Disp: , Rfl:   •  primidone (MYSOLINE) 50 MG tablet, Take 50 mg by mouth., Disp: , Rfl:   •  sertraline (ZOLOFT) 100 MG tablet, Take 200 mg by mouth Daily., Disp: , Rfl:   •  vitamin B-12 (CYANOCOBALAMIN) 100 MCG tablet, Take 1,000 mg by mouth Daily., Disp: , Rfl:     Allergies:   Allergies   Allergen Reactions   • Benzamide Derivatives Hives   • Lisinopril Cough       PHQ-9 Total Score:     STEADI Fall Risk Assessment has not been completed.    Objective     Physical Exam:   Physical Exam  Eyes:      Pupils: Pupils are equal, round, and reactive to light.   Neurological:      Mental Status: He is oriented to person, place, and time.      Coordination: Finger-Nose-Finger Test abnormal.      Deep Tendon Reflexes:      Reflex Scores:       Tricep reflexes are 1+ on the  right side and 1+ on the left side.       Bicep reflexes are 1+ on the right side and 1+ on the left side.       Brachioradialis reflexes are 1+ on the right side and 1+ on the left side.       Patellar reflexes are 1+ on the right side and 1+ on the left side.       Achilles reflexes are 1+ on the right side and 1+ on the left side.  Psychiatric:         Speech: Speech normal.         Neurologic Exam     Mental Status   Oriented to person, place, and time.   Follows 3 step commands.   Attention: normal. Concentration: normal.   Speech: speech is normal   Level of consciousness: alert  Knowledge: consistent with education.   Normal comprehension.     Cranial Nerves     CN III, IV, VI   Pupils are equal, round, and reactive to light.  Right pupil: Accommodation: intact.   Left pupil: Accommodation: intact.   CN III: no CN III palsy  CN VI: no CN VI palsy  Nystagmus: none   Diplopia: none  Upgaze: normal  Downgaze: normal  Conjugate gaze: present    CN VII   Facial expression full, symmetric.     CN VIII   Hearing: intact    CN XII   CN XII normal.     Motor Exam   Muscle bulk: normal  Overall muscle tone: normal    Strength   Right neck flexion: 4/5  Left neck flexion: 4/5  Right neck extension: 4/5  Left neck extension: 4/5  Right deltoid: 4/5  Left deltoid: 4/5  Right biceps: 4/5  Left biceps: 4/5  Right triceps: 4/5  Left triceps: 4/5  Right wrist flexion: 4/5  Left wrist flexion: 4/5  Right wrist extension: 4/5  Left wrist extension: 4/5  Right interossei: 4/5  Left interossei: 4/5  Right abdominals: 4/5  Left abdominals: 4/5  Right iliopsoas: 4/5  Left iliopsoas: 4/5  Right quadriceps: 4/5  Left quadriceps: 4/5  Right hamstrin/5  Left hamstrin/5  Right glutei: 4/5  Left glutei: 4/5  Right anterior tibial: 4/5  Left anterior tibial: 4/5  Right posterior tibial: 4/5  Left posterior tibial: 4/5  Right peroneal: 4/5  Left peroneal: 4/5  Right gastroc: 4/5  Left gastroc: 4/5Right thumb MCP is deformed. Has  "duptyrene's contractures of bilat hands on ring fingers.     Sensory Exam   Light touch normal.     Gait, Coordination, and Reflexes     Gait  Gait: non-neurologic (walks w cane)    Coordination   Finger to nose coordination: abnormal    Tremor   Resting tremor: absent  Action tremor: left arm    Reflexes   Right brachioradialis: 1+  Left brachioradialis: 1+  Right biceps: 1+  Left biceps: 1+  Right triceps: 1+  Left triceps: 1+  Right patellar: 1+  Left patellar: 1+  Right achilles: 1+  Left achilles: 1+  Right : 1+  Left : 1+Left index finger        Vital Signs:   Vitals:    03/17/22 1055   BP: 134/60   Pulse: 76   Temp: 96.9 °F (36.1 °C)   SpO2: 98%   Weight: 110 kg (243 lb)   Height: 188 cm (74\")     Body mass index is 31.2 kg/m².         Assessment / Plan      Assessment/Plan:   Diagnoses and all orders for this visit:    1. Paresthesia (Primary)  Comments:  Repeat EMG  Orders:  -     EMG & Nerve Conduction Test; Future    2. Tremor  Comments:  Cont primidone    3. Dupuytren's contracture of both hands  Comments:  Will need referral to ortho for treatment. Will wait until EMG results return in case there are additional diagnosis for ortho referral.           Patient Education:     Reviewed medications, potential side effects and signs and symptoms to report. Discussed risk versus benefits of treatment plan with patient and/or family-including medications, labs and radiology that may be ordered. Addressed questions and concerns during visit. Patient and/or family verbalized understanding and agree with plan. Instructed to call the office with any questions and report to ER with any life-threatening symptoms.     Follow Up:   After EMG  During this visit the following were done:  Labs Reviewed [x]    Labs Ordered []    Radiology Reports Reviewed []    Radiology Ordered []    PCP Records Reviewed []    Referring Provider Records Reviewed [x]    ER Records Reviewed []    Hospital Records Reviewed []  "   History Obtained From Family []    Radiology Images Reviewed []    Other Reviewed [x]     Records Requested []      Natalya Harper, DNP, APRN

## 2022-03-17 NOTE — TELEPHONE ENCOUNTER
MATTHEW [facility] CALLING FROM [phone]  EXT 1964 CALLING BACK REGARDING VM FROM VANESSA   REGARDING EMG PLEASE CALL BACK

## 2022-05-05 ENCOUNTER — PROCEDURE VISIT (OUTPATIENT)
Dept: NEUROLOGY | Facility: CLINIC | Age: 67
End: 2022-05-05

## 2022-05-05 DIAGNOSIS — G56.22 ULNAR NEUROPATHY AT ELBOW OF LEFT UPPER EXTREMITY: ICD-10-CM

## 2022-05-05 DIAGNOSIS — E11.42 DIABETIC PERIPHERAL NEUROPATHY: Chronic | ICD-10-CM

## 2022-05-05 DIAGNOSIS — G56.03 BILATERAL CARPAL TUNNEL SYNDROME: Primary | ICD-10-CM

## 2022-05-05 PROCEDURE — 95911 NRV CNDJ TEST 9-10 STUDIES: CPT | Performed by: PSYCHIATRY & NEUROLOGY

## 2022-05-05 PROCEDURE — 95886 MUSC TEST DONE W/N TEST COMP: CPT | Performed by: PSYCHIATRY & NEUROLOGY

## 2022-05-05 NOTE — PROGRESS NOTES
[facility]   Electrodiagnostic Laboratory    Nerve Conduction & EMG Report        Patient:  Milind Pack   Patient ID: [id number]   YOB: 1955  Sex:  male      Exam Physician:  Adan Neville MD  Refer Physician:  Natalya DE LOS SANTOS    Electromyogram and Nerve Conduction Velocity Procedure Note    Hx: 67 y.o. right handed male with complaint of numbness involving the the upper extremities. Symptoms have been present for several years and were provoked by no clear event. Significant past medical history includes diabetes mellitus.  Family history no family history of nerve or muscle disease.    Exam: Motor power is normal. There is no atrophy. There are no fasciculations. Deep tendon reflexes are absent. Sensory exam is abnormal distal symmetrical loss of pin and light touch in all extremities.     Edx studies of the B UE were performed to evaluate for peripheral nerve entrapment.     NCS Examination   For sensory nerve conduction studies, the amplitude is measured peak-to-peak, the latency reported is the distal peak latency, and the conduction velocity, if measured, is determined from onset latencies and is over the forearm.   For motor nerve conduction studies, the amplitude is measured baseline-to-peak, the latency reported is the distal onset latency, the conduction velocity is calculated over the forearm, and the F wave latency is the minimum latency.   Unless otherwise noted, the hand temperature was monitored continuously and remained between 32°C and 36°C during the performance of the NCSs.        Sensory NCS      Nerve / Sites Rec. Site Onset Lat Peak Lat NP Amp PP Amp Segments Distance Velocity     ms ms µV µV  mm m/s   R Median - Digit II (Antidromic)      Wrist Dig II NR NR NR NR Wrist - Dig  NR   L Median - Digit II (Antidromic)      Wrist Dig II 3.39 4.27 12.9 21.1 Wrist - Dig  38   R Ulnar - Digit V (Antidromic)      Wrist Dig V 2.14 2.92 22.2  10.6 Wrist - Dig V 110 52   L Ulnar - Digit V (Antidromic)      Wrist Dig V NR NR NR NR Wrist - Dig V 110 NR   R Radial - Anatomical snuff box (Forearm)      Forearm Wrist 1.04 1.51 16.5 1.9 Forearm - Wrist 100 96   L Radial - Anatomical snuff box (Forearm)      Forearm Wrist 1.61 2.19 23.0 1.7 Forearm - Wrist 100 62                   Motor NCS      Nerve / Sites Muscle Latency Amplitude Amp % Duration Segments Distance Lat Diff Velocity     ms mV % ms  mm ms m/s   R Median - APB      Wrist APB 4.84 5.6 100 6.30 Wrist - APB 70        Elbow APB 10.16 4.9 87 7.03 Elbow - Wrist 250 5.31 47   L Median - APB      Wrist APB 5.73 1.1 100 8.80 Wrist - APB 70        Elbow APB 10.83 1.5 130 7.66 Elbow - Wrist 250 5.10 49   R Ulnar - ADM      Wrist ADM 2.81 5.3 100 4.95 Wrist - ADM 70        B.Elbow ADM 8.75 4.8 89.7 5.47 B.Elbow - Wrist 260 5.94 44      A.Elbow ADM 10.10 4.7 89.3 6.04 A.Elbow - B.Elbow 90 1.35 66   L Ulnar - ADM      Wrist ADM 3.02 4.4 100 4.90 Wrist - ADM 70        B.Elbow ADM 9.01 3.1 71.5 5.47 B.Elbow - Wrist 240 5.99 40      A.Elbow ADM 12.50 3.2 72.7 5.89 A.Elbow - B.Elbow 90 3.49 26               F  Wave      Nerve F Lat M Lat F-M Lat    ms ms ms   R Median - APB 36.1 4.7 31.4   R Ulnar - ADM 35.1 2.7 32.4   L Ulnar - ADM 41.5 3.0 38.5   L Median - APB 33.4 4.7 28.6                 EMG Examination   The study was performed with a concentric needle electrode. Fibrillation and fasciculation activity is graded from none (0) to continuous (4+). The configuration and recruitment pattern of motor unit action potentials under voluntary control, if not normal, are described below           EMG Summary Table     Spontaneous MUAP Recruitment   Muscle Nerve Roots IA Fib PSW Fasc H.F. Amp Dur. PPP Pattern   L. Abductor digiti minimi (manus) Ulnar C8-T1 1+ None None None None 1+ N N Reduced   R. Abductor digiti minimi (manus) Ulnar C8-T1 N None None None None 1+ N N Reduced   L. Abductor pollicis brevis Median C8-T1  N None None None None 1+ N N Reduced   R. Abductor pollicis brevis Median C8-T1 N None None None None 1+ N N Reduced   L. Biceps brachii Musculocutaneous C5-C6 N None None None None N N N N   R. Biceps brachii Musculocutaneous C5-C6 N None None None None N N N N   L. First dorsal interosseous Ulnar C8-T1 N None None None None 1+ N N Reduced   R. First dorsal interosseous Ulnar C8-T1 N None None None None 1+ N N Reduced   L. Flexor carpi ulnaris Ulnar C7-T1 N None None None None N N N N   R. Flexor carpi ulnaris Ulnar C7-T1 N None None None None N N N N   L. Pronator teres Median C6-C7 N None None None None N N N N   R. Pronator teres Median C6-C7 N None None None None N N N N   L. Triceps brachii Radial C6-C8 N None None None None N N N N   R. Triceps brachii Radial C6-C8 N None None None None N N N N   L. Deltoid Axillary C5-C6 N None None None None N N N N   R. Deltoid Axillary C5-C6 N None None None None N N N N       Summary    The motor conduction test had results outside of the specified normal range in all 4 of the tested nerves:  • In the R Median - APB study  o the take off latency result was increased for Wrist stimulation  o the take off velocity result was reduced for Elbow - Wrist segment  • In the L Median - APB study  o the take off latency result was increased for Wrist stimulation  o the peak amplitude result was reduced for Wrist stimulation  o the take off velocity result was reduced for Elbow - Wrist segment  • In the R Ulnar - ADM study  o the take off velocity result was reduced for B.Elbow - Wrist segment  • In the L Ulnar - ADM study  o the peak amplitude result was reduced for Wrist stimulation  o the take off velocity result was reduced for B.Elbow - Wrist segment  o the take off velocity result was reduced for A.Elbow - B.Elbow segment    The sensory conduction test was performed on 6 nerve(s). The results were normal in 3 nerve(s): R Ulnar - Digit V (Antidromic), R Radial - Anatomical  snuff box (Forearm), L Radial - Anatomical snuff box (Forearm). Results outside the specified normal range were found in 3 nerve(s), as follows:  • In the R Median - Digit II (Antidromic) study  o the response was considered absent for Wrist stimulation  • In the L Median - Digit II (Antidromic) study  o the peak latency result was increased for Wrist stimulation  o the peak amplitude result was reduced for Wrist stimulation  • In the L Ulnar - Digit V (Antidromic) study  o the response was considered absent for Wrist stimulation    The F wave study was normal in all 4 of the tested nerves: R Median - APB, R Ulnar - ADM, L Ulnar - ADM, L Median - APB.    The needle EMG examination was performed in 16 muscles. It was normal in 10 muscle(s): L. Biceps brachii, R. Biceps brachii, L. Flexor carpi ulnaris, R. Flexor carpi ulnaris, L. Pronator teres, R. Pronator teres, L. Triceps brachii, R. Triceps brachii, L. Deltoid, R. Deltoid. The study was abnormal in 6 muscle(s), with the following distribution:  • Abnormal spontaneous/insertional activity was found in L. Abductor digiti minimi (manus).  • The MUP waveform abnormality was found in L. Abductor digiti minimi (manus), R. Abductor digiti minimi (manus), L. Abductor pollicis brevis, R. Abductor pollicis brevis, L. First dorsal interosseous, R. First dorsal interosseous.  • Abnormal interference pattern was found in L. Abductor digiti minimi (manus), R. Abductor digiti minimi (manus), L. Abductor pollicis brevis, R. Abductor pollicis brevis, L. First dorsal interosseous, R. First dorsal interosseous.         Conclusion: This study showed neurophysiologic evidence of several abnormalities:    1.  Moderate bilateral median neuropathy at the wrists, ie carpal tunnel syndrome.  2.  Moderate left ulnar neuropathy at the elbow, ie cubital tunnel syndrome.    3.  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        Instrument used:  rankura  Synergy        Performed by:          Adan Neville MD

## 2022-05-11 ENCOUNTER — OFFICE VISIT (OUTPATIENT)
Dept: NEUROLOGY | Facility: CLINIC | Age: 67
End: 2022-05-11

## 2022-05-11 ENCOUNTER — LAB (OUTPATIENT)
Dept: LAB | Facility: HOSPITAL | Age: 67
End: 2022-05-11

## 2022-05-11 VITALS
WEIGHT: 244.6 LBS | HEART RATE: 62 BPM | BODY MASS INDEX: 31.39 KG/M2 | SYSTOLIC BLOOD PRESSURE: 124 MMHG | OXYGEN SATURATION: 98 % | HEIGHT: 74 IN | TEMPERATURE: 97.3 F | DIASTOLIC BLOOD PRESSURE: 74 MMHG

## 2022-05-11 DIAGNOSIS — R20.2 PARESTHESIA: Primary | ICD-10-CM

## 2022-05-11 DIAGNOSIS — G89.29 NECK PAIN, CHRONIC: ICD-10-CM

## 2022-05-11 DIAGNOSIS — G62.9 LENGTH-DEPENDENT PERIPHERAL NEUROPATHY: ICD-10-CM

## 2022-05-11 DIAGNOSIS — R25.1 TREMOR: ICD-10-CM

## 2022-05-11 DIAGNOSIS — R13.10 DYSPHAGIA, UNSPECIFIED TYPE: ICD-10-CM

## 2022-05-11 DIAGNOSIS — M54.2 NECK PAIN, CHRONIC: ICD-10-CM

## 2022-05-11 DIAGNOSIS — R20.2 PARESTHESIA: ICD-10-CM

## 2022-05-11 PROBLEM — E11.3399 MODERATE NONPROLIFERATIVE DIABETIC RETINOPATHY ASSOCIATED WITH TYPE 2 DIABETES MELLITUS (HCC): Status: ACTIVE | Noted: 2017-06-19

## 2022-05-11 LAB
ERYTHROCYTE [SEDIMENTATION RATE] IN BLOOD: 7 MM/HR (ref 0–20)
FOLATE SERPL-MCNC: >20 NG/ML (ref 4.78–24.2)
VIT B12 BLD-MCNC: >2000 PG/ML (ref 211–946)

## 2022-05-11 PROCEDURE — 82746 ASSAY OF FOLIC ACID SERUM: CPT

## 2022-05-11 PROCEDURE — 36415 COLL VENOUS BLD VENIPUNCTURE: CPT

## 2022-05-11 PROCEDURE — 82550 ASSAY OF CK (CPK): CPT

## 2022-05-11 PROCEDURE — 86334 IMMUNOFIX E-PHORESIS SERUM: CPT

## 2022-05-11 PROCEDURE — 86140 C-REACTIVE PROTEIN: CPT

## 2022-05-11 PROCEDURE — 85652 RBC SED RATE AUTOMATED: CPT

## 2022-05-11 PROCEDURE — 86038 ANTINUCLEAR ANTIBODIES: CPT

## 2022-05-11 PROCEDURE — 82784 ASSAY IGA/IGD/IGG/IGM EACH: CPT

## 2022-05-11 PROCEDURE — 82607 VITAMIN B-12: CPT

## 2022-05-11 PROCEDURE — 84155 ASSAY OF PROTEIN SERUM: CPT

## 2022-05-11 PROCEDURE — 86362 MOG-IGG1 ANTB CBA EACH: CPT

## 2022-05-11 PROCEDURE — 86051 AQUAPORIN-4 ANTB ELISA: CPT

## 2022-05-11 PROCEDURE — 82164 ANGIOTENSIN I ENZYME TEST: CPT

## 2022-05-11 PROCEDURE — 99214 OFFICE O/P EST MOD 30 MIN: CPT | Performed by: NURSE PRACTITIONER

## 2022-05-11 PROCEDURE — 86617 LYME DISEASE ANTIBODY: CPT

## 2022-05-11 PROCEDURE — 84443 ASSAY THYROID STIM HORMONE: CPT

## 2022-05-11 PROCEDURE — 84165 PROTEIN E-PHORESIS SERUM: CPT

## 2022-05-11 PROCEDURE — 86431 RHEUMATOID FACTOR QUANT: CPT

## 2022-05-11 RX ORDER — AMITRIPTYLINE HYDROCHLORIDE 25 MG/1
25 TABLET, FILM COATED ORAL NIGHTLY
Qty: 30 TABLET | Refills: 2 | Status: SHIPPED | OUTPATIENT
Start: 2022-05-11

## 2022-05-11 NOTE — PROGRESS NOTES
Neuro Office Visit      Encounter Date: 2022   Patient Name: Milind Pack  : 1955   MRN: [id number]   PCP: DR Metcalf  Chief Complaint:    Chief Complaint   Patient presents with   • Numbness       History of Present Illness: Milind Pack is a 67 y.o. male who is here today in Neurology for termor, arthralgia, neuropathic pain.    Last visit 3/17/2022 w me-EMG, cont primidone  Progress Notes by Adan Neville MD (2022 08:00)  Conclusion: This study showed neurophysiologic evidence of several abnormalities:     1.  Moderate bilateral median neuropathy at the wrists, ie carpal tunnel syndrome.  2.  Moderate left ulnar neuropathy at the elbow, ie cubital tunnel syndrome.    3.  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     Neuropathic pain/ Neck pain  Progressive pain neck to bilat UE for years. Intermittient electric shock. Difficult to  coins and button shirt. Numbness on dip of right index finger. Uncontrolled DM for years w insulin pump.    [facility] w compressive neuropathy median at wrist and ulnar neuropathy at cubital tunnel without active denervation but report incomplete.    Having blurred vision, occasional dysphagia. unilateral weakness on right due to hand deformity, anxiety and irritable.  Denies slurred speech, incontinence, ptosis, fatigue w chewing, no fatigue of arms.    Tremor  Mild tremor left hand index finger w activity. Treated with primidone.    Arthralgia  X-ray right hand . Deformity of right thubm MCP. Similar bony mass removed from left foot as a young adult.    Balance disorder  Trouble with balance for many years due to amputation of great toe on right foot. Walks w cane.    PMH: DM A1c 7.2 aortic aneurysm, htn, gerd, hld, MDD, LOR, aortic stenosis, T2 DM. Has LPB with muscle spasms.  Subjective      Past Medical History:   Past Medical History:   Diagnosis Date   • Bilateral carpal tunnel syndrome  2022   • Difficulty walking    • HL (hearing loss)    • Hypercholesterolemia    • Hypertension    • Nephrolithiasis    • Neuropathy in diabetes ([facility])    • S/P aortic valve replacement with porcine valve    • Sleep apnea    • Type 2 diabetes mellitus treated with insulin ([facility])        Past Surgical History:   Past Surgical History:   Procedure Laterality Date   • ADRENAL GLAND SURGERY     • AORTIC VALVE [facility]      [facility]   • CARDIAC CATHETERIZATION N/A 2020    Procedure: LEFT HEART CATH;  Surgeon: Isidoro Rahman MD;  Location: [facility] INVASIVE LOCATION;  Service: Cardiology;  Laterality: N/A;   • CARDIAC VALVE REPLACEMENT     • CATARACT EXTRACTION Bilateral    • ELBOW PROCEDURE Left 1991    Bone spur removal   • TOE SURGERY Left     first toe x2 removed joint        Family History:   Family History   Problem Relation Age of Onset   • Cancer Mother         breast and kidney   • Other Mother         Tobacco use   • Obesity Mother    • Diabetes Mother    • Heart disease Father    • Heart failure Father    • Heart defect Father    • Diabetes Father    • Diabetes type II Brother    • Osteoarthritis Brother    • No Known Problems Maternal Grandmother    • No Known Problems Maternal Grandfather    • No Known Problems Paternal Grandmother    • Heart failure Paternal Grandfather        Social History:   Social History     Socioeconomic History   • Marital status:    Tobacco Use   • Smoking status: Former Smoker     Packs/day: 1.50     Years: 8.00     Pack years: 12.00     Types: Cigarettes     Start date: 1963     Quit date: 1974     Years since quittin.3   • Smokeless tobacco: Never Used   Substance and Sexual Activity   • Alcohol use: Not Currently     Alcohol/week: 39.0 standard drinks     Types: 12 Glasses of wine, 24 Cans of beer, 2 Shots of liquor, 1 Drinks containing 0.5 oz of alcohol per week   • Drug use: No   • Sexual activity: Yes     Partners: Female        Medications:     Current Outpatient Medications:   •  aspirin 81 MG EC tablet, Take 81 mg by mouth Daily., Disp: , Rfl:   •  atorvastatin (LIPITOR) 80 MG tablet, Take 40 mg by mouth Daily., Disp: , Rfl:   •  carvedilol (COREG) 12.5 MG tablet, Take 12.5 mg by mouth 2 (Two) Times a Day., Disp: , Rfl:   •  Diclofenac Sodium (VOLTAREN) 1 % gel gel, Apply 4 g topically to the appropriate area as directed 4 (Four) Times a Day As Needed., Disp: , Rfl:   •  famotidine (PEPCID) 10 MG tablet, Take 10 mg by mouth 2 (Two) Times a Day., Disp: , Rfl:   •  FIBER COMPLETE PO, Take  by mouth., Disp: , Rfl:   •  insulin aspart (novoLOG FLEXPEN) 100 UNIT/ML solution pen-injector sc pen, Inject  under the skin As Needed. Uses as a backup for insulin pump, Disp: , Rfl:   •  insulin aspart (NovoLOG) 100 UNIT/ML patient supplied pump, Inject  under the skin into the appropriate area as directed Continuous. 1.2 units/hr basal rate 1.25 units correction 1:5 carbs  Target , Disp: , Rfl:   •  insulin glargine (LANTUS) 100 UNIT/ML injection, 30 units once daily PRN off insulin pump, Disp: , Rfl:   •  losartan (COZAAR) 25 MG tablet, Take 25 mg by mouth Daily., Disp: , Rfl:   •  metFORMIN (GLUCOPHAGE) 1000 MG tablet, Take 1,000 mg by mouth 2 (Two) Times a Day With Meals., Disp: , Rfl:   •  multivitamin with minerals tablet tablet, Take 1 tablet by mouth Daily., Disp: , Rfl:   •  Omega-3 Fatty Acids (FISH OIL) 1000 MG capsule capsule, Take 1 capsule by mouth Daily., Disp: , Rfl:   •  sertraline (ZOLOFT) 100 MG tablet, Take 200 mg by mouth Daily., Disp: , Rfl:   •  vitamin B-12 (CYANOCOBALAMIN) 100 MCG tablet, Take 1,000 mg by mouth Daily., Disp: , Rfl:   •  amitriptyline (ELAVIL) 25 MG tablet, Take 1 tablet by mouth Every Night., Disp: 30 tablet, Rfl: 2  •  primidone (MYSOLINE) 50 MG tablet, Take 50 mg by mouth., Disp: , Rfl:     Allergies:   Allergies   Allergen Reactions   • Benzamide Derivatives Hives   • Lisinopril Cough        PHQ-9 Total Score:     STEADI Fall Risk Assessment was completed, and patient is at LOW risk for falls.Assessment completed on:5/11/2022    Objective     Physical Exam:   Physical Exam  Eyes:      Pupils: Pupils are equal, round, and reactive to light.   Neurological:      Mental Status: He is oriented to person, place, and time.      Coordination: Finger-Nose-Finger Test abnormal and Heel to Shin Test abnormal. Romberg Test normal.      Gait: Tandem walk abnormal.      Deep Tendon Reflexes:      Reflex Scores:       Tricep reflexes are 1+ on the right side and 1+ on the left side.       Bicep reflexes are 1+ on the right side and 1+ on the left side.       Brachioradialis reflexes are 1+ on the right side and 1+ on the left side.       Patellar reflexes are 1+ on the right side and 1+ on the left side.       Achilles reflexes are 1+ on the right side and 1+ on the left side.  Psychiatric:         Speech: Speech normal.         Neurologic Exam     Mental Status   Oriented to person, place, and time.   Follows 3 step commands.   Attention: normal. Concentration: normal.   Speech: speech is normal   Level of consciousness: alert  Knowledge: consistent with education.   Normal comprehension.     Cranial Nerves     CN III, IV, VI   Pupils are equal, round, and reactive to light.  Right pupil: Accommodation: intact.   Left pupil: Accommodation: intact.   CN III: no CN III palsy  CN VI: no CN VI palsy  Nystagmus: none   Diplopia: none  Upgaze: normal  Downgaze: normal  Conjugate gaze: present    CN VII   Facial expression full, symmetric.     CN VIII   Hearing: intact    CN XII   CN XII normal.     Motor Exam   Muscle bulk: normal  Overall muscle tone: normal    Strength   Right neck flexion: 4/5  Left neck flexion: 4/5  Right neck extension: 4/5  Left neck extension: 4/5  Right deltoid: 4/5  Left deltoid: 4/5  Right biceps: 4/5  Left biceps: 4/5  Right triceps: 4/5  Left triceps: 4/5  Right wrist flexion: 4/5  Left  "wrist flexion: 4/5  Right wrist extension: 4/5  Left wrist extension: 4/5  Right interossei: 4/5  Left interossei: 4/5  Right abdominals: 4/5  Left abdominals: 4/5  Right iliopsoas: 4/5  Left iliopsoas: 4/5  Right quadriceps: 4/5  Left quadriceps: 4/5  Right hamstrin/5  Left hamstrin/5  Right glutei: 4/5  Left glutei: 4/5  Right anterior tibial: 4/5  Left anterior tibial: 4/5  Right posterior tibial: 4/5  Left posterior tibial: 4/5  Right peroneal: 4/5  Left peroneal: 4/5  Right gastroc: 4/5  Left gastroc: 4/5    Sensory Exam   Light touch normal.     Gait, Coordination, and Reflexes     Coordination   Romberg: negative  Finger to nose coordination: abnormal  Heel to shin coordination: abnormal  Tandem walking coordination: abnormal    Tremor   Resting tremor: absent  Action tremor: absent    Reflexes   Right brachioradialis: 1+  Left brachioradialis: 1+  Right biceps: 1+  Left biceps: 1+  Right triceps: 1+  Left triceps: 1+  Right patellar: 1+  Left patellar: 1+  Right achilles: 1+  Left achilles: 1+  Right : 1+  Left : 1+       Vital Signs:   Vitals:    22 1312   BP: 124/74   Pulse: 62   Temp: 97.3 °F (36.3 °C)   SpO2: 98%   Weight: 111 kg (244 lb 9.6 oz)   Height: 188 cm (74.02\")     Body mass index is 31.39 kg/m².         Assessment / Plan      Assessment/Plan:   Diagnoses and all orders for this visit:    1. Paresthesia (Primary)  -     LOIS by IFA, Reflex 9-biomarkers profile; Future  -     Angiotensin Converting Enzyme; Future  -     NMO IgG Autoantibodies; Future  -     Anti-Myelin Oligodendrocyte Glycoprotein (MOG), Serum; Future  -     Rheumatoid Factor; Future  -     Sedimentation Rate; Future  -     CK; Future  -     C-reactive Protein; Future  -     CHRISTIANO + PE; Future  -     Vitamin B12 & Folate; Future  -     TSH; Future  -     Lyme Disease, Line Blot; Future  -     MRI Brain With & Without Contrast; Future  -     MRI Cervical Spine With & Without Contrast; Future    2. Neck pain, " chronic  -     MRI Brain With & Without Contrast; Future  -     MRI Cervical Spine With & Without Contrast; Future    3. Dysphagia, unspecified type  -     MRI Brain With & Without Contrast; Future    4. Tremor  Comments:  Cont Primidone  Orders:  -     MRI Cervical Spine With & Without Contrast; Future    5. Length-dependent peripheral neuropathy  -     MRI Cervical Spine With & Without Contrast; Future  -     amitriptyline (ELAVIL) 25 MG tablet; Take 1 tablet by mouth Every Night.  Dispense: 30 tablet; Refill: 2         Patient Education:       Reviewed medications, potential side effects and signs and symptoms to report. Discussed risk versus benefits of treatment plan with patient and/or family-including medications, labs and radiology that may be ordered. Addressed questions and concerns during visit. Patient and/or family verbalized understanding and agree with plan. Instructed to call the office with any questions and report to ER with any life-threatening symptoms.     Follow Up:   Return in about 6 weeks (around 6/22/2022) for Recheck.    During this visit the following were done:  Labs Reviewed []    Labs Ordered [x]    Radiology Reports Reviewed []    Radiology Ordered [x]    PCP Records Reviewed []    Referring Provider Records Reviewed []    ER Records Reviewed []    Hospital Records Reviewed []    History Obtained From Family []    Radiology Images Reviewed []    Other Reviewed [x]    Records Requested []      Natalya Harper, ASIYA, APRN

## 2022-05-12 LAB
CHROMATIN AB SERPL-ACNC: <10 IU/ML (ref 0–14)
CK SERPL-CCNC: 188 U/L (ref 20–200)
CRP SERPL-MCNC: 0.55 MG/DL (ref 0–0.5)
TSH SERPL DL<=0.05 MIU/L-ACNC: 0.85 UIU/ML (ref 0.27–4.2)

## 2022-05-13 LAB — ACE SERPL-CCNC: 51 U/L (ref 14–82)

## 2022-05-14 LAB
ANA SER QL IF: NEGATIVE
LABORATORY COMMENT REPORT: NORMAL

## 2022-05-16 LAB
ALBUMIN SERPL ELPH-MCNC: 4.1 G/DL (ref 2.9–4.4)
ALBUMIN/GLOB SERPL: 1.7 {RATIO} (ref 0.7–1.7)
ALPHA1 GLOB SERPL ELPH-MCNC: 0.2 G/DL (ref 0–0.4)
ALPHA2 GLOB SERPL ELPH-MCNC: 0.6 G/DL (ref 0.4–1)
AQP4 H2O CHANNEL IGG SERPL IA-ACNC: <1.5 U/ML (ref 0–3)
B-GLOBULIN SERPL ELPH-MCNC: 0.9 G/DL (ref 0.7–1.3)
GAMMA GLOB SERPL ELPH-MCNC: 0.8 G/DL (ref 0.4–1.8)
GLOBULIN SER-MCNC: 2.5 G/DL (ref 2.2–3.9)
IGA SERPL-MCNC: 159 MG/DL (ref 61–437)
IGG SERPL-MCNC: 870 MG/DL (ref 603–1613)
IGM SERPL-MCNC: 169 MG/DL (ref 20–172)
INTERPRETATION SERPL IEP-IMP: NORMAL
LABORATORY COMMENT REPORT: NORMAL
M PROTEIN SERPL ELPH-MCNC: NORMAL G/DL
MOG AB SER QL CBA IFA: NEGATIVE
PROT SERPL-MCNC: 6.6 G/DL (ref 6–8.5)

## 2022-05-23 LAB
B BURGDOR IGG PATRN SER IB-IMP: NEGATIVE
B BURGDOR IGM PATRN SER IB-IMP: NEGATIVE
B BURGDOR18KD IGG SER QL IB: ABNORMAL
B BURGDOR23KD IGG SER QL IB: ABNORMAL
B BURGDOR23KD IGM SER QL IB: ABNORMAL
B BURGDOR28KD IGG SER QL IB: ABNORMAL
B BURGDOR30KD IGG SER QL IB: ABNORMAL
B BURGDOR39KD IGG SER QL IB: ABNORMAL
B BURGDOR39KD IGM SER QL IB: ABNORMAL
B BURGDOR41KD IGG SER QL IB: PRESENT
B BURGDOR41KD IGM SER QL IB: ABNORMAL
B BURGDOR45KD IGG SER QL IB: ABNORMAL
B BURGDOR58KD IGG SER QL IB: ABNORMAL
B BURGDOR66KD IGG SER QL IB: ABNORMAL
B BURGDOR93KD IGG SER QL IB: ABNORMAL

## 2022-05-27 ENCOUNTER — TELEPHONE (OUTPATIENT)
Dept: NEUROLOGY | Facility: CLINIC | Age: 67
End: 2022-05-27

## 2022-05-27 NOTE — TELEPHONE ENCOUNTER
Called patient and gave results.  Patient was understanding.    ----- Message from Natalya Harper DNP, APRN sent at 5/27/2022  8:26 AM EDT -----  Please notify pt that overall his labs are reassuring. He is negative for autoimmune connective tissue disease, rheumatoid arthritis, muscle breakdown, lyme disease, thyroid, vit b12 or folate deficiency.

## 2022-06-19 ENCOUNTER — HOSPITAL ENCOUNTER (OUTPATIENT)
Dept: MRI IMAGING | Facility: HOSPITAL | Age: 67
Discharge: HOME | End: 2022-06-19

## 2022-06-19 DIAGNOSIS — R13.10 DYSPHAGIA, UNSPECIFIED TYPE: ICD-10-CM

## 2022-06-19 DIAGNOSIS — G89.29 NECK PAIN, CHRONIC: ICD-10-CM

## 2022-06-19 DIAGNOSIS — R20.2 PARESTHESIA: ICD-10-CM

## 2022-06-19 DIAGNOSIS — G62.9 LENGTH-DEPENDENT PERIPHERAL NEUROPATHY: ICD-10-CM

## 2022-06-19 DIAGNOSIS — R25.1 TREMOR: ICD-10-CM

## 2022-06-19 DIAGNOSIS — M54.2 NECK PAIN, CHRONIC: ICD-10-CM

## 2022-06-19 PROCEDURE — A9577 INJ MULTIHANCE: HCPCS | Performed by: NURSE PRACTITIONER

## 2022-06-19 PROCEDURE — 0 GADOBENATE DIMEGLUMINE 529 MG/ML SOLUTION: Performed by: NURSE PRACTITIONER

## 2022-06-19 PROCEDURE — 72156 MRI NECK SPINE W/O & W/DYE: CPT

## 2022-06-19 PROCEDURE — 70553 MRI BRAIN STEM W/O & W/DYE: CPT

## 2022-06-19 RX ADMIN — GADOBENATE DIMEGLUMINE 20 ML: 529 INJECTION, SOLUTION INTRAVENOUS at 13:16

## 2022-06-20 ENCOUNTER — TELEPHONE (OUTPATIENT)
Dept: NEUROLOGY | Facility: CLINIC | Age: 67
End: 2022-06-20

## 2022-06-20 DIAGNOSIS — G56.03 BILATERAL CARPAL TUNNEL SYNDROME: Primary | ICD-10-CM

## 2022-06-20 DIAGNOSIS — R20.2 PARESTHESIA: ICD-10-CM

## 2022-06-20 DIAGNOSIS — G56.22 ULNAR NEUROPATHY AT ELBOW OF LEFT UPPER EXTREMITY: ICD-10-CM

## 2022-06-20 NOTE — TELEPHONE ENCOUNTER
Discussed MRI brain and MRI c-spine and EMG. Mod CTS and mod cubital tunnel on L. Will refer to Ortho for eval. Pt verbalized understanding.

## 2022-07-07 ENCOUNTER — OFFICE VISIT (OUTPATIENT)
Dept: NEUROLOGY | Facility: CLINIC | Age: 67
End: 2022-07-07

## 2022-07-07 VITALS
HEART RATE: 84 BPM | WEIGHT: 242.2 LBS | TEMPERATURE: 97.1 F | HEIGHT: 74 IN | DIASTOLIC BLOOD PRESSURE: 70 MMHG | OXYGEN SATURATION: 97 % | SYSTOLIC BLOOD PRESSURE: 136 MMHG | BODY MASS INDEX: 31.08 KG/M2

## 2022-07-07 DIAGNOSIS — M48.02 CERVICAL STENOSIS OF SPINAL CANAL: ICD-10-CM

## 2022-07-07 DIAGNOSIS — G56.22 ULNAR NEUROPATHY AT ELBOW OF LEFT UPPER EXTREMITY: ICD-10-CM

## 2022-07-07 DIAGNOSIS — R26.89 BALANCE DISORDER: ICD-10-CM

## 2022-07-07 DIAGNOSIS — M54.2 NECK PAIN, CHRONIC: ICD-10-CM

## 2022-07-07 DIAGNOSIS — G89.29 NECK PAIN, CHRONIC: ICD-10-CM

## 2022-07-07 DIAGNOSIS — R25.1 TREMOR: Primary | ICD-10-CM

## 2022-07-07 PROCEDURE — 99214 OFFICE O/P EST MOD 30 MIN: CPT | Performed by: NURSE PRACTITIONER

## 2022-07-07 RX ORDER — FAMOTIDINE 40 MG/1
40 TABLET, FILM COATED ORAL DAILY
COMMUNITY

## 2022-07-07 RX ORDER — TOPIRAMATE 25 MG/1
25 TABLET ORAL 2 TIMES DAILY
Qty: 60 TABLET | Refills: 2 | Status: SHIPPED | OUTPATIENT
Start: 2022-07-07

## 2022-07-07 NOTE — PROGRESS NOTES
Neuro Office Visit      Encounter Date: 2022   Patient Name: Milind Pack  : 1955   MRN: [id number]   PCP: Dr Metcalf  Chief Complaint:    Chief Complaint   Patient presents with   • Numbness       History of Present Illness: Milind Pack is a 67 y.o. male who is here today in Neurology for paresthesia.    Last visit 2022 w me-labs, MRI brain and c-spine, cont primidone, start elavil  MRI Brain With & Without Contrast (2022 13:15)  MRI Cervical Spine With & Without Contrast (2022 13:17)    Labs MOG, Lyme, tsh Vit b 12, folate, CHRISTIANO, ck sed rate, rf, NMO, ACE and LOIS-nml  CRP only slightly elevated.    Neuropathic pain/neck pain  Elavil offering some relief from numbness  Progressive pain in neck to BUE for years. Electric shock sensation. Difficult to button shirt. Numbness on dip of right index finger.     [facility]G w compressive neuropathy median at wrist and ulnar neuropathy at cubital tunnel without active denervation but report incomplete.    Progress Notes by Adan Neville MD (2022 08:00)  Conclusion: This study showed neurophysiologic evidence of several abnormalities:     1.  Moderate bilateral median neuropathy at the wrists, ie carpal tunnel syndrome.  2.  Moderate left ulnar neuropathy at the elbow, ie cubital tunnel syndrome.    3.  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       Complains of blurred vision, occ dysphagia, unilateral weakness right hand due to deformity.    Tremor  Mild tremor left hand treated with primidone without success at 200mg daily.    Arthralgia  Right hand x-ray w deformity right MCP. Similar bony mass removed right foot. Walks w cane.    Balance disorder  Trouble for years due to great toe amputation on right.    Uncontrolled DM w insulin pump.    PMH: DM A1c 7.2 aortic aneurysm, htn, gerd, hld, MDD, LOR, aortic stenosis, T2 DM. Has LPB with muscle spasms.  Subjective      Past Medical  History:   Past Medical History:   Diagnosis Date   • Bilateral carpal tunnel syndrome 2022   • Difficulty walking    • HL (hearing loss)    • Hypercholesterolemia    • Hypertension    • Nephrolithiasis    • Neuropathy in diabetes ([facility])    • S/P aortic valve replacement with porcine valve    • Sleep apnea    • Type 2 diabetes mellitus treated with insulin ([facility])        Past Surgical History:   Past Surgical History:   Procedure Laterality Date   • ADRENAL GLAND SURGERY     • AORTIC VALVE [facility]      [facility]   • CARDIAC CATHETERIZATION N/A 2020    Procedure: LEFT HEART CATH;  Surgeon: Isidoro Rahman MD;  Location: [facility] CATH INVASIVE LOCATION;  Service: Cardiology;  Laterality: N/A;   • CARDIAC VALVE REPLACEMENT     • CATARACT EXTRACTION Bilateral    • ELBOW PROCEDURE Left 1991    Bone spur removal   • TOE SURGERY Left     first toe x2 removed joint        Family History:   Family History   Problem Relation Age of Onset   • Cancer Mother         breast and kidney   • Other Mother         Tobacco use   • Obesity Mother    • Diabetes Mother    • Heart disease Father    • Heart failure Father    • Heart defect Father    • Diabetes Father    • Diabetes type II Brother    • Osteoarthritis Brother    • No Known Problems Maternal Grandmother    • No Known Problems Maternal Grandfather    • No Known Problems Paternal Grandmother    • Heart failure Paternal Grandfather        Social History:   Social History     Socioeconomic History   • Marital status:    Tobacco Use   • Smoking status: Former Smoker     Packs/day: 1.50     Years: 8.00     Pack years: 12.00     Types: Cigarettes     Start date: 1963     Quit date: 1974     Years since quittin.5   • Smokeless tobacco: Never Used   Substance and Sexual Activity   • Alcohol use: Not Currently     Alcohol/week: 39.0 standard drinks     Types: 12 Glasses of wine, 24 Cans of beer, 2 Shots of liquor, 1 Drinks containing 0.5  oz of alcohol per week   • Drug use: No   • Sexual activity: Yes     Partners: Female       Medications:     Current Outpatient Medications:   •  amitriptyline (ELAVIL) 25 MG tablet, Take 1 tablet by mouth Every Night., Disp: 30 tablet, Rfl: 2  •  aspirin 81 MG EC tablet, Take 81 mg by mouth Daily., Disp: , Rfl:   •  atorvastatin (LIPITOR) 80 MG tablet, Take 40 mg by mouth Daily., Disp: , Rfl:   •  carvedilol (COREG) 12.5 MG tablet, Take 12.5 mg by mouth 2 (Two) Times a Day., Disp: , Rfl:   •  Diclofenac Sodium (VOLTAREN) 1 % gel gel, Apply 4 g topically to the appropriate area as directed 4 (Four) Times a Day As Needed., Disp: , Rfl:   •  famotidine (PEPCID) 40 MG tablet, Take 40 mg by mouth Daily., Disp: , Rfl:   •  FIBER COMPLETE PO, Take  by mouth., Disp: , Rfl:   •  insulin aspart (NovoLOG) 100 UNIT/ML patient supplied pump, Inject  under the skin into the appropriate area as directed Continuous. 1.2 units/hr basal rate 1.25 units correction 1:5 carbs  Target , Disp: , Rfl:   •  losartan (COZAAR) 25 MG tablet, Take 25 mg by mouth Daily., Disp: , Rfl:   •  metFORMIN (GLUCOPHAGE) 1000 MG tablet, Take 1,000 mg by mouth 2 (Two) Times a Day With Meals., Disp: , Rfl:   •  multivitamin with minerals tablet tablet, Take 1 tablet by mouth Daily., Disp: , Rfl:   •  Omega-3 Fatty Acids (FISH OIL) 1000 MG capsule capsule, Take 1 capsule by mouth Daily., Disp: , Rfl:   •  sertraline (ZOLOFT) 100 MG tablet, Take 200 mg by mouth Daily., Disp: , Rfl:   •  vitamin B-12 (CYANOCOBALAMIN) 100 MCG tablet, Take 1,000 mg by mouth Daily., Disp: , Rfl:   •  topiramate (TOPAMAX) 25 MG tablet, Take 1 tablet by mouth 2 (Two) Times a Day., Disp: 60 tablet, Rfl: 2    Allergies:   Allergies   Allergen Reactions   • Benzamide Derivatives Hives   • Lisinopril Cough       PHQ-9 Total Score:     STEADI Fall Risk Assessment was completed, and patient is at LOW risk for falls.Assessment completed on:7/7/2022    Objective     Physical Exam:    Physical Exam  Neurological:      Mental Status: He is oriented to person, place, and time.      Coordination: Heel to Shin Test abnormal. Finger-Nose-Finger Test and Romberg Test normal.      Gait: Tandem walk abnormal.      Deep Tendon Reflexes:      Reflex Scores:       Tricep reflexes are 1+ on the right side and 1+ on the left side.       Bicep reflexes are 1+ on the right side and 1+ on the left side.       Brachioradialis reflexes are 1+ on the right side and 1+ on the left side.       Patellar reflexes are 1+ on the right side and 1+ on the left side.       Achilles reflexes are 1+ on the right side and 1+ on the left side.  Psychiatric:         Speech: Speech normal.         Neurologic Exam     Mental Status   Oriented to person, place, and time.   Follows 3 step commands.   Attention: normal. Concentration: normal.   Speech: speech is normal   Level of consciousness: alert  Knowledge: consistent with education.   Normal comprehension.     Cranial Nerves     CN III, IV, VI   Right pupil: Accommodation: intact.   Left pupil: Accommodation: intact.   CN III: no CN III palsy  CN VI: no CN VI palsy  Nystagmus: none   Diplopia: none  Upgaze: normal  Downgaze: normal  Conjugate gaze: present    CN VII   Facial expression full, symmetric.     CN VIII   Hearing: intact    CN XII   CN XII normal.     Motor Exam   Muscle bulk: normal  Overall muscle tone: normal    Strength   Right neck flexion: 4/5  Left neck flexion: 4/5  Right neck extension: 4/5  Left neck extension: 4/5  Right deltoid: 4/5  Left deltoid: 4/5  Right biceps: 4/5  Left biceps: 4/5  Right triceps: 4/5  Left triceps: 4/5  Right wrist flexion: 4/5  Left wrist flexion: 4/5  Right wrist extension: 4/5  Left wrist extension: 4/5  Right interossei: 4/5  Left interossei: 4/5  Right abdominals: 4/5  Left abdominals: 4/5  Right iliopsoas: 4/5  Left iliopsoas: 4/5  Right quadriceps: 4/5  Left quadriceps: 4/5  Right hamstrin/5  Left hamstrin/5  Right  "glutei: 4/5  Left glutei: 4/5  Right anterior tibial: 4/5  Left anterior tibial: 4/5  Right posterior tibial: 4/5  Left posterior tibial: 4/5  Right peroneal: 4/5  Left peroneal: 4/5  Right gastroc: 4/5  Left gastroc: 4/5    Sensory Exam   Right arm light touch: decreased from fingers  Left arm light touch: decreased from fingers  Right leg light touch: normal  Left leg light touch: normal    Gait, Coordination, and Reflexes     Gait  Gait: circumduction    Coordination   Romberg: negative  Finger to nose coordination: normal  Heel to shin coordination: abnormal  Tandem walking coordination: abnormal    Tremor   Resting tremor: absent  Action tremor: absent    Reflexes   Right brachioradialis: 1+  Left brachioradialis: 1+  Right biceps: 1+  Left biceps: 1+  Right triceps: 1+  Left triceps: 1+  Right patellar: 1+  Left patellar: 1+  Right achilles: 1+  Left achilles: 1+  Right : 1+  Left : 1+       Vital Signs:   Vitals:    07/07/22 1258   BP: 136/70   Pulse: 84   Temp: 97.1 °F (36.2 °C)   SpO2: 97%   Weight: 110 kg (242 lb 3.2 oz)   Height: 188 cm (74.02\")     Body mass index is 31.08 kg/m².     Results:   Imaging:   MRI Brain With & Without Contrast    Result Date: 6/20/2022    1.  No acute intracranial abnormality. 2.  No pathologic contrast enhancement. 3.  Chronic pansinusitis  This report was finalized on 6/20/2022 10:03 AM by Tu De Los Santos MD.      MRI Cervical Spine With & Without Contrast    Result Date: 6/20/2022   1.  Multilevel degenerative disc disease and degenerative facet change resulting in multilevel neural foraminal narrowing as detailed above. There is no significant spinal canal stenosis. 2.  Normal signal seen within the substance of the spinal cord. 3.  No pathologic contrast enhancement.  This report was finalized on 6/20/2022 10:08 AM by Tu De Los Santos MD.         Assessment / Plan      Assessment/Plan:   Diagnoses and all orders for this visit:    1. Tremor (Primary)  -     " topiramate (TOPAMAX) 25 MG tablet; Take 1 tablet by mouth 2 (Two) Times a Day.  Dispense: 60 tablet; Refill: 2    2. Neck pain, chronic  -     Ambulatory Referral to Neurosurgery    3. Cervical stenosis of spinal canal  Comments:  Referral to neurosurg to discuss possible treatment  Orders:  -     Ambulatory Referral to Neurosurgery    4. Ulnar neuropathy at elbow of left upper extremity  Comments:  Keep appt with Ortho  Orders:  -     Ambulatory Referral to Neurosurgery    5. Balance disorder  Comments:  Cont use of cane to prevent falls.           Patient Education:     Reviewed medications, potential side effects and signs and symptoms to report. Discussed risk versus benefits of treatment plan with patient and/or family-including medications, labs and radiology that may be ordered. Addressed questions and concerns during visit. Patient and/or family verbalized understanding and agree with plan. Instructed to call the office with any questions and report to ER with any life-threatening symptoms.     Follow Up:   Return in about 3 months (around 10/7/2022) for Recheck.    During this visit the following were done:  Labs Reviewed [x]    Labs Ordered []    Radiology Reports Reviewed [x]    Radiology Ordered []    PCP Records Reviewed []    Referring Provider Records Reviewed []    ER Records Reviewed []    Hospital Records Reviewed []    History Obtained From Family []    Radiology Images Reviewed []    Other Reviewed [x]    Records Requested []      Natalya Harper, ASIYA, APRN

## 2022-07-21 ENCOUNTER — OFFICE VISIT (OUTPATIENT)
Dept: ORTHOPEDIC SURGERY | Facility: CLINIC | Age: 67
End: 2022-07-21

## 2022-07-21 VITALS
WEIGHT: 242.51 LBS | DIASTOLIC BLOOD PRESSURE: 72 MMHG | SYSTOLIC BLOOD PRESSURE: 112 MMHG | BODY MASS INDEX: 31.12 KG/M2 | HEIGHT: 74 IN

## 2022-07-21 DIAGNOSIS — G56.22 CUBITAL TUNNEL SYNDROME ON LEFT: ICD-10-CM

## 2022-07-21 DIAGNOSIS — M79.642 LEFT HAND PAIN: Primary | ICD-10-CM

## 2022-07-21 DIAGNOSIS — G56.03 CARPAL TUNNEL SYNDROME, BILATERAL: ICD-10-CM

## 2022-07-21 PROCEDURE — 99203 OFFICE O/P NEW LOW 30 MIN: CPT | Performed by: PHYSICIAN ASSISTANT

## 2022-07-21 NOTE — PROGRESS NOTES
[facility] Note        Subjective     Pain, Initial Evaluation, and Numbness of the Left Wrist (And hand) and Pain, Initial Evaluation, and Numbness of the Right Wrist (And hand)      HPI    Milind Pack is a 67 y.o. male. This is a very pleasant patient, RHD, presenting from neurology referral to discuss his left index finger tremor and Bilateral CTS and Left ulnar neuropathy diagnosed on EMG.   He reports numbness and tingling in the median nerve distribution, L>R for years.  He has treated with bracing without any improvement of pain.  He had EMG 5/5/22.  He is here for further treatment recommendations.      Past Medical History:   Diagnosis Date   • Ankle sprain 1994   • Arthritis of back 2015   • Arthritis of neck 2022   • Bilateral carpal tunnel syndrome 05/05/2022   • Cervical disc disorder 2001   • Difficulty walking 1997   • Fracture, finger 1967   • Fracture, foot 2005   • Frozen shoulder 2015   • HL (hearing loss) 1997   • Hypercholesterolemia    • Hypertension    • Knee swelling 2003   • Low back strain 1985   • Lumbosacral disc disease 1985   • Neck strain 1999   • Nephrolithiasis    • Neuropathy in diabetes ([facility]) 2010   • Periarthritis of shoulder 2015   • S/P aortic valve replacement with porcine valve    • Scoliosis 2015   • Sleep apnea 2014   • Tear of meniscus of knee 1994   • Tendinitis of knee 1994   • Tennis elbow 1994   • Type 2 diabetes mellitus treated with insulin ([facility])       Past Surgical History:   Procedure Laterality Date   • ADRENAL GLAND SURGERY     • [facility]   • CARDIAC CATHETERIZATION N/A 06/29/2020    Procedure: LEFT HEART CATH;  Surgeon: Isidoro Rahman MD;  Location: [facility] INVASIVE LOCATION;  Service: Cardiology;  Laterality: N/A;   • CARDIAC VALVE REPLACEMENT  2012   • CATARACT EXTRACTION Bilateral 2018   • ELBOW PROCEDURE Left 1991    Bone spur removal   • FOOT SURGERY  1974   • KNEE SURGERY  1994   • TOE  SURGERY Left     first toe x2 removed joint       Family History   Problem Relation Age of Onset   • Cancer Mother         breast and kidney   • Other Mother         Tobacco use   • Obesity Mother    • Diabetes Mother    • Broken bones Mother    • Heart disease Father    • Heart failure Father    • Heart defect Father    • Diabetes Father    • Diabetes type II Brother    • Osteoarthritis Brother    • No Known Problems Maternal Grandmother    • No Known Problems Maternal Grandfather    • No Known Problems Paternal Grandmother    • Heart failure Paternal Grandfather      Social History     Socioeconomic History   • Marital status:    Tobacco Use   • Smoking status: Former Smoker     Packs/day: 1.50     Years: 10.00     Pack years: 15.00     Types: Cigarettes, Cigarettes     Start date: 1963     Quit date: 1974     Years since quittin.5   • Smokeless tobacco: Never Used   Substance and Sexual Activity   • Alcohol use: Not Currently     Alcohol/week: 32.0 standard drinks     Types: 2 Glasses of wine, 24 Cans of beer, 5 Shots of liquor, 1 Drinks containing 0.5 oz of alcohol per week     Comment: Alcohol abuse   • Drug use: Not Currently   • Sexual activity: Yes     Partners: Female      Current Outpatient Medications on File Prior to Visit   Medication Sig Dispense Refill   • amitriptyline (ELAVIL) 25 MG tablet Take 1 tablet by mouth Every Night. 30 tablet 2   • aspirin 81 MG EC tablet Take 81 mg by mouth Daily.     • atorvastatin (LIPITOR) 80 MG tablet Take 40 mg by mouth Daily.     • carvedilol (COREG) 12.5 MG tablet Take 12.5 mg by mouth 2 (Two) Times a Day.     • Diclofenac Sodium (VOLTAREN) 1 % gel gel Apply 4 g topically to the appropriate area as directed 4 (Four) Times a Day As Needed.     • famotidine (PEPCID) 40 MG tablet Take 40 mg by mouth Daily.     • FIBER COMPLETE PO Take  by mouth.     • insulin aspart (NovoLOG) 100 UNIT/ML patient supplied pump Inject  under the skin into the  "appropriate area as directed Continuous. 1.2 units/hr basal rate  1.25 units correction  1:5 carbs   Target      • losartan (COZAAR) 25 MG tablet Take 25 mg by mouth Daily.     • metFORMIN (GLUCOPHAGE) 1000 MG tablet Take 1,000 mg by mouth 2 (Two) Times a Day With Meals.     • multivitamin with minerals tablet tablet Take 1 tablet by mouth Daily.     • Omega-3 Fatty Acids (FISH OIL) 1000 MG capsule capsule Take 1 capsule by mouth Daily.     • sertraline (ZOLOFT) 100 MG tablet Take 200 mg by mouth Daily.     • topiramate (TOPAMAX) 25 MG tablet Take 1 tablet by mouth 2 (Two) Times a Day. 60 tablet 2   • vitamin B-12 (CYANOCOBALAMIN) 100 MCG tablet Take 1,000 mg by mouth Daily.       No current facility-administered medications on file prior to visit.      Allergies   Allergen Reactions   • Benzamide Derivatives Hives   • Lisinopril Cough          Review of Systems   Constitutional: Negative.    HENT: Negative.    Eyes: Negative.    Respiratory: Negative.    Cardiovascular: Negative.    Gastrointestinal: Negative.    Endocrine: Negative.    Genitourinary: Negative.    Musculoskeletal: Positive for arthralgias.   Skin: Negative.    Allergic/Immunologic: Negative.    Neurological: Negative.    Hematological: Negative.    Psychiatric/Behavioral: Negative.         I reviewed the patient's chief complaint, history of present illness, review of systems, past medical history, surgical history, family history, social history, medications and allergy list.        Objective      Physical Exam  /72   Ht 188 cm (74.02\")   Wt 110 kg (242 lb 8.1 oz)   BMI 31.12 kg/m²     Body mass index is 31.12 kg/m².    General  Mental Status - alert  General Appearance - cooperative, well groomed, not in acute distress  Orientation - Oriented X3  Build & Nutrition - well developed and well nourished  Posture - normal posture  Gait - normal       Ortho Exam  Peripheral Vascular   Bilateral Upper Extremity    No cyanotic nail " beds    Pink nail beds and rapid capillary refill   Palpation    Radial Pulse - Bilaterally normal    Neurologic   Sensory: Light touch intact- Right and left hand    Left Upper Extremity    Left wrist extensors: 5/5    Left wrist flexors: 5/5    Left intrinsics: 5/5   Right Upper Extremity    Right wrist extensors: 5/5    Right wrist flexors: 5/5    Right intrinsics: 5/5    Musculoskeletal   Left Elbow    Forearm supination: AROM - 90 degrees    Forearm pronation: AROM - 90 degrees   Right Elbow    Forearm supination: AROM - 90 degrees    Forearm pronation: AROM - 90 degrees     Inspection and Palpation   Right Wrist      Tenderness - none    Swelling - none    Crepitus - none    Muscle tone - no atrophy   Left Wrist    Tenderness - none    Swelling - none    Crepitus - none    Muscle tone - no atrophy     ROJM:   Left Wrist    Flexion: AROM - 90 degrees    Extension: AROM - 90 degrees   Right Wrist    Flexion: AROM - 90 degrees    Extension: AROM - 90 degrees     Deformities, Malalignments, Discrepancies    None     Functional Testing   Right Wrist    Tinel's Sign negative    Phalen's Sign negative    Carpal Compression Test negative   Left Wrist    Tinel's Sign negative    Phalen's Sign negative    Carpal Compression Test negative       Strength and Tone    Right  strength: good    Left  strength: good     Hand Exam:          Full range of motion of the thumb MCPJ and IPJ right with subluxation of the IP joint on the left    Full range of motion of the index finger MCPJ, PIPJ and DIPJ  bilaterally    Full range of motion of the middle finger MCPJ, PIPJ and DIPJ bilaterally    Full range of motion of the ring finger MCPJ, PIPJ and DIPJ bilaterally    Full range of motion of the small finger MCPJ, PIPJ and DIPJ bialterally    FDS, FDP and extensor tendons are intact in the index, middle, ring and small fingers bilaterally    FPJ and extensor tendons are intact in the thumb.    No palpable  triggering                    Assessment    Assessment:  1. Left hand pain    2. Carpal tunnel syndrome, bilateral    3. Cubital tunnel syndrome on left          Plan:  Recommend over-the-counter medication as needed for discomfort  Bilateral CTS with left cubital tunnel.  I reviewed today's right hand x-ray, EMG from 5/5/2022 clinical findings has continued patient.  Patient does have moderate carpal tunnel bilaterally and moderate ulnar neuropathy on the left.  The ulnar neuropathy is asymptomatic.  Discussed further treatment including continued bracing, cortisone injection and surgical release.  Patient has difficulty keeping his diabetes under control under control and is not interested in injection.  He is awaiting further cardiac work-up from a recent ED visit.  He will continue bracing and return to see us as needed.    Patient history, diagnosis and treatment plan discussed with Dr. Sanchez.          Georgina Canela PA-C  07/24/22  13:46 EDT

## 2022-09-07 ENCOUNTER — OFFICE VISIT (OUTPATIENT)
Dept: NEUROSURGERY | Facility: CLINIC | Age: 67
End: 2022-09-07

## 2022-09-07 VITALS — BODY MASS INDEX: 29.54 KG/M2 | HEIGHT: 75 IN | TEMPERATURE: 97.1 F | WEIGHT: 237.6 LBS

## 2022-09-07 DIAGNOSIS — M54.2 NECK PAIN: Primary | ICD-10-CM

## 2022-09-07 DIAGNOSIS — M50.30 DDD (DEGENERATIVE DISC DISEASE), CERVICAL: ICD-10-CM

## 2022-09-07 PROCEDURE — 99203 OFFICE O/P NEW LOW 30 MIN: CPT | Performed by: NEUROLOGICAL SURGERY

## 2022-09-07 RX ORDER — VARDENAFIL HYDROCHLORIDE 20 MG/1
20 TABLET ORAL
COMMUNITY

## 2022-09-07 RX ORDER — ATORVASTATIN CALCIUM 40 MG/1
40 TABLET, FILM COATED ORAL DAILY
COMMUNITY
End: 2022-09-07 | Stop reason: SDUPTHER

## 2022-09-07 RX ORDER — FEXOFENADINE HYDROCHLORIDE 60 MG/1
60 TABLET, FILM COATED ORAL
COMMUNITY
Start: 2022-04-22

## 2022-09-07 RX ORDER — PANTOPRAZOLE SODIUM 40 MG/1
40 TABLET, DELAYED RELEASE ORAL
COMMUNITY
Start: 2022-07-11 | End: 2022-09-07 | Stop reason: SDUPTHER

## 2022-09-07 RX ORDER — ALBUTEROL SULFATE 90 UG/1
2 AEROSOL, METERED RESPIRATORY (INHALATION) EVERY 6 HOURS PRN
COMMUNITY
Start: 2022-04-21

## 2022-09-07 NOTE — PROGRESS NOTES
Patient: Milind Pack  : 1955    Primary Care Provider: Jessica Metcalf MD    Requesting Provider: As above        History    Chief Complaint: Chronic neck pain and stiffness.    History of Present Illness: Mr. Pack is a 67-year-old right-handed retired gentleman who has a many year history of neck pain and stiffness.  The pain is largely constant but worse at times.  It typically involves the right side of his neck and will at times extend down the right arm to the elbow.  Physical therapy has helped some.  He has some modest symptoms in his left upper extremity.  Heat and ice are helpful.  He is worse with lifting or looking up.    Review of Systems   Constitutional: Negative for activity change, appetite change, chills, diaphoresis, fatigue, fever and unexpected weight change.   HENT: Positive for sinus pressure and tinnitus. Negative for congestion, dental problem, drooling, ear discharge, ear pain, facial swelling, hearing loss, mouth sores, nosebleeds, postnasal drip, rhinorrhea, sinus pain, sneezing, sore throat, trouble swallowing and voice change.    Eyes: Positive for itching. Negative for photophobia, pain, discharge, redness and visual disturbance.   Respiratory: Negative for apnea, cough, choking, chest tightness, shortness of breath, wheezing and stridor.    Cardiovascular: Negative for chest pain, palpitations and leg swelling.   Gastrointestinal: Negative for abdominal distention, abdominal pain, anal bleeding, blood in stool, constipation, diarrhea, nausea, rectal pain and vomiting.   Endocrine: Negative for cold intolerance, heat intolerance, polydipsia, polyphagia and polyuria.   Genitourinary: Negative for decreased urine volume, difficulty urinating, dysuria, enuresis, flank pain, frequency, genital sores, hematuria, penile discharge, penile pain, penile swelling, scrotal swelling, testicular pain and urgency.   Musculoskeletal: Negative for arthralgias, back pain, gait  "problem, joint swelling, myalgias, neck pain and neck stiffness.   Skin: Negative for color change, pallor, rash and wound.   Allergic/Immunologic: Negative for environmental allergies, food allergies and immunocompromised state.   Neurological: Negative for dizziness, tremors, seizures, syncope, facial asymmetry, speech difficulty, weakness, light-headedness, numbness and headaches.   Hematological: Negative for adenopathy. Does not bruise/bleed easily.   Psychiatric/Behavioral: Negative for agitation, behavioral problems, confusion, decreased concentration, dysphoric mood, hallucinations, self-injury, sleep disturbance and suicidal ideas. The patient is not nervous/anxious and is not hyperactive.        The patient's past medical history, past surgical history, family history, and social history have been reviewed at length in the electronic medical record.      Physical Exam:   Temp 97.1 °F (36.2 °C) (Infrared)   Ht 190.5 cm (75\")   Wt 108 kg (237 lb 9.6 oz)   BMI 29.70 kg/m²   CONSTITUTIONAL: Patient is well-nourished, pleasant and appears stated age.  MUSCULOSKELETAL:  Neck tenderness to palpation is not observed.   ROM in the neck is limited in all directions but more so in rotation rightward and in extension.  Ranging his right shoulder is well-tolerated without limitation or pain.  NEUROLOGICAL:  Orientation, memory, attention span, language function, and cognition have been examined and are intact.  Strength is intact in the upper and lower extremities to direct testing.  Muscle tone is normal throughout.  Station and gait are normal.  Sensation is intact to light touch testing throughout.  Deep tendon reflexes are 1+ and symmetrical.  Citlaly's Sign is negative bilaterally. No clonus is elicited.  Coordination is intact.      Medical Decision Making    Data Review:   (All imaging studies were personally reviewed unless stated otherwise)  MRI of the cervical spine dated 6/18/2022 demonstrates some " diffuse spondylosis.  There is loss of normal lordosis.  There is some recess and foraminal narrowing on the left at C3-4.  More modest findings without significant root or canal compromise are noted at other levels.    Diagnosis:   Cervical spondylosis with mechanical neck pain.    Treatment Options:   There is no role for surgical intervention.  I am going to refer the patient to one of my pain colleagues for some facet blocks and if necessary rhizotomies.  He will follow-up with neurosurgery on an as-needed basis.       Diagnosis Plan   1. Neck pain     2. DDD (degenerative disc disease), cervical         Scribed for Brennen Ramirez MD by JOSE Collado 9/7/2022 10:47 EDT      I, Dr. Ramirez, personally performed the services described in the documentation, as scribed in my presence, and it is both accurate and complete.

## 2024-11-29 NOTE — PROGRESS NOTES
[facility]  [facility]  Chest Pain Clinic    Encounter Date:04/06/2018     Milind Pack  [address] [address]  [phone]    1955    No Known Provider    Milind Pack is a 63 y.o. male.      Subjective:     Chief Complaint:  Chest Pain       HPI patient presents to the chest pain clinic today for ongoing evaluation of his chest pain. He notes that he has been experiencing worsening SoA, fatigue, and chest pain over the last month. He notes that he and his wife were working in the yard on 4/4/18 when he began experiencing significant dyspnea. He notes that he sat down but it took him awhile to recover. He notes that [facility] that was WNL.     Cardiac risk factors:   dyslipidemia, hypertension, diabetes.  Remote Tobacco use, sedentary lifestyle, obesity (BMI > 30), gender, age (>50)      Allergies   Allergen Reactions   • Lisinopril Cough         Current Outpatient Prescriptions:   •  aspirin 81 MG EC tablet, Take 81 mg by mouth Daily., Disp: , Rfl:   •  atorvastatin (LIPITOR) 80 MG tablet, Take 40 mg by mouth Daily., Disp: , Rfl:   •  benazepril (LOTENSIN) 5 MG tablet, Take 2.5 mg by mouth Every Evening., Disp: , Rfl:   •  insulin aspart (novoLOG FLEXPEN) 100 UNIT/ML solution pen-injector sc pen, Inject  under the skin As Needed. Uses as a backup for insulin pump, Disp: , Rfl:   •  insulin aspart (NovoLOG) 100 UNIT/ML patient supplied pump, Inject  under the skin Continuous., Disp: , Rfl:   •  metFORMIN (GLUCOPHAGE) 1000 MG tablet, Take 1,000 mg by mouth 2 (Two) Times a Day With Meals., Disp: , Rfl:   •  metoprolol tartrate (LOPRESSOR) 25 MG tablet, Take 25 mg by mouth 2 (Two) Times a Day., Disp: , Rfl:   •  naproxen sodium (ALEVE) 220 MG tablet, Take 1-2 tablets by mouth See Admin Instructions. Takes 2 tablets QAM and 1 tablet QPM, Disp: , Rfl:   •  Omega-3 Fatty Acids (FISH OIL) 1000 MG capsule capsule, Take 1 capsule  by mouth Daily., Disp: , Rfl:   •  raNITIdine (ZANTAC) 150 MG tablet, Take 150 mg by mouth 2 (Two) Times a Day., Disp: , Rfl:   •  sertraline (ZOLOFT) 100 MG tablet, Take 200 mg by mouth Daily., Disp: , Rfl:     The following portions of the patient's history were reviewed and updated as appropriate in Epic:  Problem list, allergies, current medications, past medical and surgical history, past social and family history.     Review of Systems   Constitution: Positive for weight gain. Negative for chills, decreased appetite, diaphoresis, fever, weakness, malaise/fatigue, night sweats and weight loss.   HENT: Negative for congestion, hearing loss, hoarse voice and nosebleeds.    Eyes: Negative for blurred vision, visual disturbance and visual halos.   Cardiovascular: Positive for chest pain and dyspnea on exertion. Negative for claudication, cyanosis, irregular heartbeat, leg swelling, near-syncope, orthopnea, palpitations, paroxysmal nocturnal dyspnea and syncope.   Respiratory: Positive for cough and shortness of breath. Negative for hemoptysis, sleep disturbances due to breathing, snoring, sputum production and wheezing.    Hematologic/Lymphatic: Negative for bleeding problem. Bruises/bleeds easily.   Skin: Negative for dry skin, itching and rash.   Musculoskeletal: Positive for joint pain and muscle weakness. Negative for arthritis, falls, joint swelling and myalgias.   Gastrointestinal: Negative for bloating, abdominal pain, constipation, diarrhea, flatus, heartburn, hematemesis, hematochezia, melena, nausea and vomiting.   Genitourinary: Negative for dysuria, frequency, hematuria, nocturia and urgency.   Neurological: Negative for excessive daytime sleepiness, dizziness, headaches, light-headedness and loss of balance.   Psychiatric/Behavioral: Negative for depression. The patient does not have insomnia and is not nervous/anxious.    Allergic/Immunologic: Positive for environmental allergies.       Objective:  "    Vitals:    04/06/18 0846 04/06/18 0856 04/06/18 0857   BP: 121/73 125/75 119/73   BP Location: Right arm Left arm Left arm   Patient Position: Sitting Sitting Standing   Pulse: 54 57 61   Resp: 16     Temp: 97.5 °F (36.4 °C)     TempSrc: Temporal Artery      SpO2: 97%     Weight: 109 kg (240 lb 4.8 oz)     Height: 190.5 cm (75\")           Physical Exam   Constitutional: He is oriented to person, place, and time. He appears well-developed and well-nourished. He is active and cooperative. No distress.   HENT:   Head: Normocephalic and atraumatic.   Mouth/Throat: Oropharynx is clear and moist.   Eyes: Conjunctivae and EOM are normal. Pupils are equal, round, and reactive to light.   Neck: Normal range of motion. Neck supple. No JVD present. No tracheal deviation present. No thyromegaly present.   Cardiovascular: Normal rate, regular rhythm, normal heart sounds and intact distal pulses.    Pulmonary/Chest: Effort normal and breath sounds normal.   Abdominal: Soft. Bowel sounds are normal. He exhibits no distension. There is no tenderness.   Musculoskeletal: Normal range of motion.   Neurological: He is alert and oriented to person, place, and time.   Skin: Skin is warm, dry and intact.   Psychiatric: He has a normal mood and affect. His behavior is normal.   Nursing note and vitals reviewed.      Lab and Diagnostic Review:  Results for orders placed or performed during the hospital encounter of 04/04/18   Comprehensive Metabolic Panel   Result Value Ref Range    Glucose 157 (H) 70 - 100 mg/dL    BUN 25 (H) 9 - 23 mg/dL    Creatinine 1.30 0.60 - 1.30 mg/dL    Sodium 135 132 - 146 mmol/L    Potassium 5.5 3.5 - 5.5 mmol/L    Chloride 102 99 - 109 mmol/L    CO2 29.0 20.0 - 31.0 mmol/L    Calcium 9.0 8.7 - 10.4 mg/dL    Total Protein 6.8 5.7 - 8.2 g/dL    Albumin 4.20 3.20 - 4.80 g/dL    ALT (SGPT) 33 7 - 40 U/L    AST (SGOT) 37 (H) 0 - 33 U/L    Alkaline Phosphatase 94 25 - 100 U/L    Total Bilirubin 0.6 0.3 - 1.2 mg/dL "    eGFR Non African Amer 55 (L) >60 mL/min/1.73    Globulin 2.6 gm/dL    A/G Ratio 1.6 1.5 - 2.5 g/dL    BUN/Creatinine Ratio 19.2 7.0 - 25.0    Anion Gap 4.0 3.0 - 11.0 mmol/L   Lipase   Result Value Ref Range    Lipase 38 6 - 51 U/L   BNP   Result Value Ref Range    BNP 52.0 0.0 - 100.0 pg/mL   CBC Auto Differential   Result Value Ref Range    WBC 10.15 3.50 - 10.80 10*3/mm3    RBC 4.97 4.20 - 5.76 10*6/mm3    Hemoglobin 15.8 13.1 - 17.5 g/dL    Hematocrit 45.2 38.9 - 50.9 %    MCV 90.9 80.0 - 99.0 fL    MCH 31.8 (H) 27.0 - 31.0 pg    MCHC 35.0 32.0 - 36.0 g/dL    RDW 12.8 11.3 - 14.5 %    RDW-SD 41.9 37.0 - 54.0 fl    MPV 10.8 6.0 - 12.0 fL    Platelets 189 150 - 450 10*3/mm3    Neutrophil % 69.0 41.0 - 71.0 %    Lymphocyte % 20.5 (L) 24.0 - 44.0 %    Monocyte % 7.6 0.0 - 12.0 %    Eosinophil % 2.2 0.0 - 3.0 %    Basophil % 0.2 0.0 - 1.0 %    Immature Grans % 0.5 0.0 - 0.6 %    Neutrophils, Absolute 7.01 1.50 - 8.30 10*3/mm3    Lymphocytes, Absolute 2.08 0.60 - 4.80 10*3/mm3    Monocytes, Absolute 0.77 0.00 - 1.00 10*3/mm3    Eosinophils, Absolute 0.22 0.00 - 0.30 10*3/mm3    Basophils, Absolute 0.02 0.00 - 0.20 10*3/mm3    Immature Grans, Absolute 0.05 (H) 0.00 - 0.03 10*3/mm3   Troponin   Result Value Ref Range    Troponin I <0.006 <=0.039 ng/mL   Troponin   Result Value Ref Range    Troponin I <0.006 <=0.039 ng/mL   Light Blue Top   Result Value Ref Range    Extra Tube hold for add-on    Green Top (Gel)   Result Value Ref Range    Extra Tube Hold for add-ons.    Lavender Top   Result Value Ref Range    Extra Tube hold for add-on    Gold Top - SST   Result Value Ref Range    Extra Tube Hold for add-ons.      Assessment and Plan:     1. Precordial pain  DANELLE score 2  - Stress Test With Myocardial Perfusion (1 Day); Future    2. Essential hypertension  Well controlled  HTN Education provided today including signs and symptoms, medication management, daily blood pressure monitoring. Patient encouraged to call the  Heart and Valve center with any abnormal readings.   - Stress Test With Myocardial Perfusion (1 Day); Future    3. Mixed hyperlipidemia  Currently on statin therapy  - Stress Test With Myocardial Perfusion (1 Day); Future    4. Shortness of breath    - Stress Test With Myocardial Perfusion (1 Day); Future    5. Type 2 diabetes mellitus without complication, with long-term current use of insulin  Insulin pump with oral agents  - Stress Test With Myocardial Perfusion (1 Day); Future    It has been a pleasure to participate in the care of this patient.  Patient was instructed to call the [facility] Center with any questions, concerns, or worsening symptoms.    *Please note that portions of this note were completed with a voice recognition program. Efforts were made to edit the dictations, but occasionally words are mistranscribed.         ambulatory

## (undated) DEVICE — CATH DIAG EXPO .056 FL5 6F 100CM

## (undated) DEVICE — INTRO SHEATH ART/FEM ENGAGE .038 6F12CM

## (undated) DEVICE — GW J TP FIX CORE .035 150

## (undated) DEVICE — STARCLOSE SE VASCULAR CLOSURE SYSTEM: Brand: STARCLOSE SE

## (undated) DEVICE — SKIN PREP TRAY W/CHG: Brand: MEDLINE INDUSTRIES, INC.

## (undated) DEVICE — CATH DIAG EXPO M/ PK 6FR FL4/FR4 PIG 3PK

## (undated) DEVICE — KT MANIFOLD CATHLAB CUST

## (undated) DEVICE — PK CATH CARD 10